# Patient Record
Sex: FEMALE | Race: BLACK OR AFRICAN AMERICAN | NOT HISPANIC OR LATINO | Employment: OTHER | URBAN - METROPOLITAN AREA
[De-identification: names, ages, dates, MRNs, and addresses within clinical notes are randomized per-mention and may not be internally consistent; named-entity substitution may affect disease eponyms.]

---

## 2017-02-10 ENCOUNTER — HISTORICAL (OUTPATIENT)
Dept: RADIOLOGY | Facility: HOSPITAL | Age: 52
End: 2017-02-10

## 2017-10-10 ENCOUNTER — HISTORICAL (OUTPATIENT)
Dept: LAB | Facility: HOSPITAL | Age: 52
End: 2017-10-10

## 2017-10-10 LAB
ABS NEUT (OLG): 2.93 X10(3)/MCL (ref 2.1–9.2)
ALBUMIN SERPL-MCNC: 3.3 GM/DL (ref 3.4–5)
ALBUMIN/GLOB SERPL: 1 {RATIO}
ALP SERPL-CCNC: 61 UNIT/L (ref 45–117)
ALT SERPL-CCNC: 18 UNIT/L (ref 13–56)
APPEARANCE, UA: CLEAR
AST SERPL-CCNC: 16 UNIT/L (ref 15–37)
BACTERIA SPEC CULT: NORMAL
BASOPHILS # BLD AUTO: 0.02 X10(3)/MCL (ref 0–0.2)
BASOPHILS NFR BLD AUTO: 0.3 % (ref 0–1)
BILIRUB SERPL-MCNC: 0.3 MG/DL (ref 0.2–1)
BILIRUB UR QL STRIP: NEGATIVE
BILIRUBIN DIRECT+TOT PNL SERPL-MCNC: 0.1 MG/DL (ref 0–0.2)
BILIRUBIN DIRECT+TOT PNL SERPL-MCNC: 0.2 MG/DL (ref 0–1)
BUN SERPL-MCNC: 13 MG/DL (ref 7–18)
CALCIUM SERPL-MCNC: 8.5 MG/DL (ref 8.5–10.1)
CHLORIDE SERPL-SCNC: 109 MMOL/L (ref 98–107)
CHOLEST SERPL-MCNC: 234 MG/DL (ref 0–200)
CHOLEST/HDLC SERPL: 5.8 {RATIO} (ref 0–4)
CO2 SERPL-SCNC: 27 MMOL/L (ref 21–32)
COLOR UR: YELLOW
CREAT SERPL-MCNC: 0.85 MG/DL (ref 0.55–1.02)
DEPRECATED CALCIDIOL+CALCIFEROL SERPL-MC: 22.2 NG/ML (ref 30–80)
EOSINOPHIL # BLD AUTO: 0.24 X10(3)/MCL (ref 0–0.9)
EOSINOPHIL NFR BLD AUTO: 4.1 % (ref 0–6.4)
ERYTHROCYTE [DISTWIDTH] IN BLOOD BY AUTOMATED COUNT: 13.2 % (ref 11.5–17)
EST. AVERAGE GLUCOSE BLD GHB EST-MCNC: 117 MG/DL
GLOBULIN SER-MCNC: 4 GM/DL (ref 2–4)
GLUCOSE (UA): NEGATIVE
GLUCOSE SERPL-MCNC: 90 MG/DL (ref 74–106)
HBA1C MFR BLD: 5.7 % (ref 4.2–6.3)
HCT VFR BLD AUTO: 38.9 % (ref 37–47)
HDLC SERPL-MCNC: 40 MG/DL (ref 35–60)
HGB BLD-MCNC: 12.5 GM/DL (ref 12–16)
HGB UR QL STRIP: ABNORMAL
IMM GRANULOCYTES # BLD AUTO: 0.01 10*3/UL (ref 0–0.02)
IMM GRANULOCYTES NFR BLD AUTO: 0.2 % (ref 0–0.43)
KETONES UR QL STRIP: NEGATIVE
LDLC SERPL CALC-MCNC: 135 MG/DL (ref 0–129)
LEUKOCYTE ESTERASE UR QL STRIP: NEGATIVE
LYMPHOCYTES # BLD AUTO: 2.31 X10(3)/MCL (ref 0.6–4.6)
LYMPHOCYTES NFR BLD AUTO: 39.4 % (ref 16–44)
MCH RBC QN AUTO: 26.3 PG (ref 27–31)
MCHC RBC AUTO-ENTMCNC: 32.1 GM/DL (ref 33–36)
MCV RBC AUTO: 81.7 FL (ref 80–94)
MONOCYTES # BLD AUTO: 0.36 X10(3)/MCL (ref 0.1–1.3)
MONOCYTES NFR BLD AUTO: 6.1 % (ref 4–12.1)
NEUTROPHILS # BLD AUTO: 2.93 X10(3)/MCL (ref 2.1–9.2)
NEUTROPHILS NFR BLD AUTO: 49.9 % (ref 43–73)
NITRITE UR QL STRIP: NEGATIVE
NRBC BLD AUTO-RTO: 0 % (ref 0–0.2)
PH UR STRIP: 6 [PH] (ref 5–7)
PLATELET # BLD AUTO: 229 X10(3)/MCL (ref 130–400)
PMV BLD AUTO: 10.6 FL (ref 7.4–10.4)
POTASSIUM SERPL-SCNC: 4.5 MMOL/L (ref 3.5–5.1)
PROT SERPL-MCNC: 7.4 GM/DL (ref 6.4–8.2)
PROT UR QL STRIP: NEGATIVE
RBC # BLD AUTO: 4.76 X10(6)/MCL (ref 4.2–5.4)
RBC #/AREA URNS HPF: NORMAL /HPF
SODIUM SERPL-SCNC: 138 MMOL/L (ref 136–145)
SP GR UR STRIP: 1.01 (ref 1–1.03)
SQUAMOUS EPITHELIAL, UA: NORMAL /LPF
T4 FREE SERPL-MCNC: 0.84 NG/DL (ref 0.76–1.46)
TRIGL SERPL-MCNC: 294 MG/DL (ref 30–150)
TSH SERPL-ACNC: 1.26 MIU/ML (ref 0.36–3.74)
UROBILINOGEN UR STRIP-ACNC: NEGATIVE
VLDLC SERPL CALC-MCNC: 59 MG/DL
WBC # SPEC AUTO: 5.9 X10(3)/MCL (ref 4.5–11.5)
WBC #/AREA URNS HPF: NORMAL /HPF

## 2018-01-03 ENCOUNTER — HISTORICAL (OUTPATIENT)
Dept: RADIOLOGY | Facility: HOSPITAL | Age: 53
End: 2018-01-03

## 2018-02-20 ENCOUNTER — HISTORICAL (OUTPATIENT)
Dept: ADMINISTRATIVE | Facility: HOSPITAL | Age: 53
End: 2018-02-20

## 2018-04-09 LAB — RAPID GROUP A STREP (OHS): NEGATIVE

## 2018-09-19 ENCOUNTER — HISTORICAL (OUTPATIENT)
Dept: LAB | Facility: HOSPITAL | Age: 53
End: 2018-09-19

## 2018-09-19 LAB
ABS NEUT (OLG): 2.19 X10(3)/MCL (ref 2.1–9.2)
ALBUMIN SERPL-MCNC: 3.3 GM/DL (ref 3.4–5)
ALBUMIN/GLOB SERPL: 1 {RATIO}
ALP SERPL-CCNC: 63 UNIT/L (ref 45–117)
ALT SERPL-CCNC: 18 UNIT/L (ref 13–56)
APPEARANCE, UA: ABNORMAL
AST SERPL-CCNC: 13 UNIT/L (ref 15–37)
BACTERIA SPEC CULT: ABNORMAL /HPF
BASOPHILS # BLD AUTO: 0.03 X10(3)/MCL (ref 0–0.2)
BASOPHILS NFR BLD AUTO: 0.6 % (ref 0–1)
BILIRUB SERPL-MCNC: 0.3 MG/DL (ref 0.2–1)
BILIRUB UR QL STRIP: NEGATIVE
BILIRUBIN DIRECT+TOT PNL SERPL-MCNC: <0.1 MG/DL (ref 0–0.2)
BILIRUBIN DIRECT+TOT PNL SERPL-MCNC: >0.2 MG/DL (ref 0–1)
BUN SERPL-MCNC: 12 MG/DL (ref 7–18)
CALCIUM SERPL-MCNC: 8.4 MG/DL (ref 8.5–10.1)
CHLORIDE SERPL-SCNC: 108 MMOL/L (ref 98–107)
CHOLEST SERPL-MCNC: 195 MG/DL (ref 0–199)
CHOLEST/HDLC SERPL: 5 MG/DL (ref 0–8)
CO2 SERPL-SCNC: 26 MMOL/L (ref 21–32)
COLOR UR: YELLOW
CREAT SERPL-MCNC: 0.75 MG/DL (ref 0.55–1.02)
DEPRECATED CALCIDIOL+CALCIFEROL SERPL-MC: 19.16 NG/ML (ref 30–80)
EOSINOPHIL # BLD AUTO: 0.28 X10(3)/MCL (ref 0–0.9)
EOSINOPHIL NFR BLD AUTO: 5.5 % (ref 0–6.4)
ERYTHROCYTE [DISTWIDTH] IN BLOOD BY AUTOMATED COUNT: 13.2 % (ref 11.5–17)
EST. AVERAGE GLUCOSE BLD GHB EST-MCNC: 123 MG/DL
GLOBULIN SER-MCNC: 4 GM/DL (ref 2–4)
GLUCOSE (UA): NEGATIVE
GLUCOSE SERPL-MCNC: 95 MG/DL (ref 74–106)
HBA1C MFR BLD: 5.9 % (ref 4.2–6.3)
HCT VFR BLD AUTO: 39.2 % (ref 37–47)
HDLC SERPL-MCNC: 43 MG/DL
HGB BLD-MCNC: 12.6 GM/DL (ref 12–16)
HGB UR QL STRIP: ABNORMAL
IMM GRANULOCYTES # BLD AUTO: 0 10*3/UL (ref 0–0.02)
IMM GRANULOCYTES NFR BLD AUTO: 0 % (ref 0–0.43)
KETONES UR QL STRIP: NEGATIVE
LDLC SERPL CALC-MCNC: 106 MG/DL (ref 0–129)
LEUKOCYTE ESTERASE UR QL STRIP: NEGATIVE
LYMPHOCYTES # BLD AUTO: 2.24 X10(3)/MCL (ref 0.6–4.6)
LYMPHOCYTES NFR BLD AUTO: 44.2 % (ref 16–44)
MCH RBC QN AUTO: 26.5 PG (ref 27–31)
MCHC RBC AUTO-ENTMCNC: 32.1 GM/DL (ref 33–36)
MCV RBC AUTO: 82.5 FL (ref 80–94)
MONOCYTES # BLD AUTO: 0.33 X10(3)/MCL (ref 0.1–1.3)
MONOCYTES NFR BLD AUTO: 6.5 % (ref 4–12.1)
MUCOUS THREADS URNS QL MICRO: ABNORMAL /LPF
NEUTROPHILS # BLD AUTO: 2.19 X10(3)/MCL (ref 2.1–9.2)
NEUTROPHILS NFR BLD AUTO: 43.2 % (ref 43–73)
NITRITE UR QL STRIP: NEGATIVE
NRBC BLD AUTO-RTO: 0 % (ref 0–0.2)
PH UR STRIP: 5.5 [PH] (ref 5–7)
PLATELET # BLD AUTO: 233 X10(3)/MCL (ref 130–400)
PMV BLD AUTO: 10.4 FL (ref 7.4–10.4)
POTASSIUM SERPL-SCNC: 4.1 MMOL/L (ref 3.5–5.1)
PROT SERPL-MCNC: 7.4 GM/DL (ref 6.4–8.2)
PROT UR QL STRIP: NEGATIVE
RBC # BLD AUTO: 4.75 X10(6)/MCL (ref 4.2–5.4)
RBC #/AREA URNS HPF: ABNORMAL /HPF
SODIUM SERPL-SCNC: 140 MMOL/L (ref 136–145)
SP GR UR STRIP: >=1.03 (ref 1–1.03)
SQUAMOUS EPITHELIAL, UA: ABNORMAL /LPF
T4 FREE SERPL-MCNC: 0.94 NG/DL (ref 0.76–1.46)
TRIGL SERPL-MCNC: 230 MG/DL (ref 0–149)
TSH SERPL-ACNC: 0.73 MIU/ML (ref 0.36–3.74)
UROBILINOGEN UR STRIP-ACNC: NEGATIVE
VLDLC SERPL CALC-MCNC: 46 MG/DL
WBC # SPEC AUTO: 5.1 X10(3)/MCL (ref 4.5–11.5)
WBC #/AREA URNS HPF: ABNORMAL /HPF

## 2018-09-28 ENCOUNTER — HISTORICAL (OUTPATIENT)
Dept: LAB | Facility: HOSPITAL | Age: 53
End: 2018-09-28

## 2018-09-28 LAB
APPEARANCE, UA: ABNORMAL
BACTERIA SPEC CULT: ABNORMAL /HPF
BILIRUB UR QL STRIP: NEGATIVE
COLOR UR: YELLOW
GLUCOSE (UA): NEGATIVE
HGB UR QL STRIP: ABNORMAL
KETONES UR QL STRIP: NEGATIVE
LEUKOCYTE ESTERASE UR QL STRIP: NEGATIVE
MUCOUS THREADS URNS QL MICRO: ABNORMAL /LPF
NITRITE UR QL STRIP: NEGATIVE
PH UR STRIP: 6 [PH] (ref 5–7)
PROT UR QL STRIP: NEGATIVE
RBC #/AREA URNS HPF: ABNORMAL /HPF
SP GR UR STRIP: >=1.03 (ref 1–1.03)
SQUAMOUS EPITHELIAL, UA: ABNORMAL /LPF
UROBILINOGEN UR STRIP-ACNC: NEGATIVE
WBC #/AREA URNS HPF: ABNORMAL /HPF

## 2018-11-12 ENCOUNTER — HISTORICAL (OUTPATIENT)
Dept: LAB | Facility: HOSPITAL | Age: 53
End: 2018-11-12

## 2018-11-14 LAB — FINAL CULTURE: NO GROWTH

## 2020-03-25 LAB
INFLUENZA A ANTIGEN, POC: NEGATIVE
INFLUENZA B ANTIGEN, POC: NEGATIVE

## 2022-04-10 ENCOUNTER — HISTORICAL (OUTPATIENT)
Dept: ADMINISTRATIVE | Facility: HOSPITAL | Age: 57
End: 2022-04-10

## 2022-04-24 VITALS
DIASTOLIC BLOOD PRESSURE: 79 MMHG | WEIGHT: 222.25 LBS | HEIGHT: 63 IN | SYSTOLIC BLOOD PRESSURE: 147 MMHG | BODY MASS INDEX: 39.38 KG/M2 | OXYGEN SATURATION: 97 %

## 2022-05-03 NOTE — HISTORICAL [facility]ER
This is a historical note converted from Enzo. Formatting and pictures may have been removed.  Please reference Enzo for original formatting and attached multimedia. Chief Complaint  PT. STATED THAT SHE STARTED WITH LEFT LEG KNEE PAIN FOR 1 MONTH. PT. REPORTS OF MINIMUM PAIN TODAY 2/10. PAIN IS WORST AFTER WORKING ALL DAY...SB  History of Present Illness  This is a 52-year-old female that comes in with complaints of?left knee and?entire left leg pain?and numbness. ?She states the leg initially started with pain away from the?buttock area to the foot. ?She also has no weakness in that leg as well.? She states recently she did have more pain in the knee itself?specifically more in the back portion of the knee. ?She has tried NSAIDs and some home exercises with?no improvement.? Pain today in the knee itself is about a 2 out of 10.? The pain is worsened by increased activity at work.  Review of Systems  ????GENERAL: No fevers, chills, unexpected weight loss or gain  ????MUSCULOSKELETAL: Joint pain, extremity pain  Physical Exam  Vitals & Measurements  BP:?156/98?  HT:?160?cm? HT:?160?cm? WT:?107?kg? WT:?107?kg? BMI:?41.8?  General: Well-developed, well-nourished.  Neuro: Alert and oriented x 3.  Psych: Normal mood and affect.  CV: Palpable radial pulses.  Resp: Smooth and unlabored.  Skin: No evidence of focal lesions or trauma.  Hem/Imm/Lymph: No evidence of lymphangitis or adenopathy.  Gait: No trendelenburg gait.  DTR: 2+, no hypo or hyperreflexia.  Coordination and Balance: No tremors or abnormal station.  Knee Exam:  No obvious deformity. Range of motion from 0-120 degrees. Negative patella grind and equal subluxation of knee cap medial and lateral < 1cm. Negative patella tendon tenderness. Negative Lachman and anterior drawer test. Negative posterior drawer test. Negative varus and valgus stress test. ?Positive?medial joint line tenderness.? Positive McMurrays with medial sided knee pain.? Negative lateral  joint line tenderness. 4/5 strength and normal skin appearance. Sensibility normal.  Assessment/Plan  1.?Cyst, Bakers knee  ? This patients x-ray show no acute osseous pathology, minimal degenerative changes, and some?lateral facet?osteoarthritis of the patella.? We discussed multiple options.? We have decided to proceed with a left knee MRI to rule out a meniscus tear and Bakers cyst. ?This will be roadmap for surgery. ?I will get x-rays of her lumbar spine on her next visit.  Ordered:  Office/Outpatient Visit Level 3 New [id number] PC  ?  2.?Tear of medial meniscus of left knee  Ordered:  Office/Outpatient Visit Level 3 New [id number] PC  ?  3.?Sciatica of left side  ? See above  Ordered:  Office/Outpatient Visit Level 3 New [id number] PC  ?   Problem List/Past Medical History  Ongoing  Morbid obesity  Osteoarthritis  Historical  No qualifying data  Procedure/Surgical History  Catheter placement in coronary artery(s) for coronary angiography, including intraprocedural injection(s) for coronary angiography, imaging supervision and interpretation; with left heart catheterization including intraprocedural injection(s) for left saul (2016), Fluoroscopy of Left Heart using Low Osmolar Contrast (2016), Fluoroscopy of Multiple Coronary Arteries using Low Osmolar Contrast (2016), Measurement of Cardiac Sampling and Pressure, Left Heart, Percutaneous Approach (2016),  section (),  section (), Hysterectomy, Tubal ligation.  Medications  MELOXICAM TAB 15MG, 15 mg= 1 tab(s), Oral, Daily  Allergies  penicillins  Social History  Alcohol - Denies Alcohol Use, 2015  Never, 2018  Substance Abuse - Denies Substance Abuse, 2015  Never, 2018  Tobacco - Denies Tobacco Use, 2015  Never smoker, 2017  Family History  Cancer: Father.  DIABETES: Mother.  Multiple sclerosis: Brother.  Rheumatoid arthritis 2017 17:45:38<$>: Mother.

## 2022-05-20 ENCOUNTER — OFFICE VISIT (OUTPATIENT)
Dept: URGENT CARE | Facility: CLINIC | Age: 57
End: 2022-05-20
Payer: COMMERCIAL

## 2022-05-20 VITALS
OXYGEN SATURATION: 98 % | SYSTOLIC BLOOD PRESSURE: 110 MMHG | DIASTOLIC BLOOD PRESSURE: 71 MMHG | WEIGHT: 224 LBS | TEMPERATURE: 98 F | BODY MASS INDEX: 41.22 KG/M2 | RESPIRATION RATE: 20 BRPM | HEART RATE: 65 BPM | HEIGHT: 62 IN

## 2022-05-20 DIAGNOSIS — M62.838 MUSCLE SPASM: Primary | ICD-10-CM

## 2022-05-20 PROBLEM — J30.2 SEASONAL ALLERGIES: Status: ACTIVE | Noted: 2022-05-20

## 2022-05-20 PROBLEM — M19.90 OSTEOARTHRITIS: Status: ACTIVE | Noted: 2022-05-20

## 2022-05-20 PROBLEM — E78.2 MIXED HYPERLIPIDEMIA: Status: ACTIVE | Noted: 2022-05-20

## 2022-05-20 PROBLEM — G43.109 MIGRAINE WITH AURA: Status: ACTIVE | Noted: 2022-05-20

## 2022-05-20 PROBLEM — R73.03 PREDIABETES: Status: ACTIVE | Noted: 2022-05-20

## 2022-05-20 PROBLEM — I10 ESSENTIAL HYPERTENSION: Status: ACTIVE | Noted: 2022-05-20

## 2022-05-20 PROCEDURE — 1159F PR MEDICATION LIST DOCUMENTED IN MEDICAL RECORD: ICD-10-PCS | Mod: CPTII,,, | Performed by: PHYSICIAN ASSISTANT

## 2022-05-20 PROCEDURE — 3078F DIAST BP <80 MM HG: CPT | Mod: CPTII,,, | Performed by: PHYSICIAN ASSISTANT

## 2022-05-20 PROCEDURE — 99213 OFFICE O/P EST LOW 20 MIN: CPT | Mod: 25,,, | Performed by: PHYSICIAN ASSISTANT

## 2022-05-20 PROCEDURE — 99213 PR OFFICE/OUTPT VISIT, EST, LEVL III, 20-29 MIN: ICD-10-PCS | Mod: 25,,, | Performed by: PHYSICIAN ASSISTANT

## 2022-05-20 PROCEDURE — 96372 PR INJECTION,THERAP/PROPH/DIAG2ST, IM OR SUBCUT: ICD-10-PCS | Mod: ,,, | Performed by: PHYSICIAN ASSISTANT

## 2022-05-20 PROCEDURE — 3008F PR BODY MASS INDEX (BMI) DOCUMENTED: ICD-10-PCS | Mod: CPTII,,, | Performed by: PHYSICIAN ASSISTANT

## 2022-05-20 PROCEDURE — 3078F PR MOST RECENT DIASTOLIC BLOOD PRESSURE < 80 MM HG: ICD-10-PCS | Mod: CPTII,,, | Performed by: PHYSICIAN ASSISTANT

## 2022-05-20 PROCEDURE — 3074F SYST BP LT 130 MM HG: CPT | Mod: CPTII,,, | Performed by: PHYSICIAN ASSISTANT

## 2022-05-20 PROCEDURE — 3008F BODY MASS INDEX DOCD: CPT | Mod: CPTII,,, | Performed by: PHYSICIAN ASSISTANT

## 2022-05-20 PROCEDURE — 1160F RVW MEDS BY RX/DR IN RCRD: CPT | Mod: CPTII,,, | Performed by: PHYSICIAN ASSISTANT

## 2022-05-20 PROCEDURE — 1159F MED LIST DOCD IN RCRD: CPT | Mod: CPTII,,, | Performed by: PHYSICIAN ASSISTANT

## 2022-05-20 PROCEDURE — 96372 THER/PROPH/DIAG INJ SC/IM: CPT | Mod: ,,, | Performed by: PHYSICIAN ASSISTANT

## 2022-05-20 PROCEDURE — 3074F PR MOST RECENT SYSTOLIC BLOOD PRESSURE < 130 MM HG: ICD-10-PCS | Mod: CPTII,,, | Performed by: PHYSICIAN ASSISTANT

## 2022-05-20 PROCEDURE — 1160F PR REVIEW ALL MEDS BY PRESCRIBER/CLIN PHARMACIST DOCUMENTED: ICD-10-PCS | Mod: CPTII,,, | Performed by: PHYSICIAN ASSISTANT

## 2022-05-20 RX ORDER — AMLODIPINE BESYLATE 5 MG/1
1 TABLET ORAL DAILY
COMMUNITY
End: 2023-11-29 | Stop reason: SDUPTHER

## 2022-05-20 RX ORDER — KETOROLAC TROMETHAMINE 30 MG/ML
30 INJECTION, SOLUTION INTRAMUSCULAR; INTRAVENOUS
Status: COMPLETED | OUTPATIENT
Start: 2022-05-20 | End: 2022-05-20

## 2022-05-20 RX ORDER — MONTELUKAST SODIUM 10 MG/1
TABLET ORAL
COMMUNITY
Start: 2022-05-13 | End: 2023-07-05 | Stop reason: SDUPTHER

## 2022-05-20 RX ORDER — FLUTICASONE PROPIONATE 50 MCG
SPRAY, SUSPENSION (ML) NASAL
COMMUNITY
End: 2023-11-27 | Stop reason: SDUPTHER

## 2022-05-20 RX ORDER — RIZATRIPTAN BENZOATE 10 MG/1
TABLET, ORALLY DISINTEGRATING ORAL
COMMUNITY
End: 2023-07-11

## 2022-05-20 RX ORDER — MELOXICAM 15 MG/1
TABLET ORAL
COMMUNITY
Start: 2021-10-07 | End: 2023-05-23

## 2022-05-20 RX ORDER — DEXAMETHASONE SODIUM PHOSPHATE 100 MG/10ML
10 INJECTION INTRAMUSCULAR; INTRAVENOUS
Status: COMPLETED | OUTPATIENT
Start: 2022-05-20 | End: 2022-05-20

## 2022-05-20 RX ORDER — FLUTICASONE FUROATE AND VILANTEROL TRIFENATATE 100; 25 UG/1; UG/1
POWDER RESPIRATORY (INHALATION)
COMMUNITY
Start: 2022-05-13 | End: 2023-05-23

## 2022-05-20 RX ORDER — CYCLOBENZAPRINE HCL 10 MG
10 TABLET ORAL 3 TIMES DAILY PRN
Qty: 15 TABLET | Refills: 0 | Status: SHIPPED | OUTPATIENT
Start: 2022-05-20 | End: 2022-05-25

## 2022-05-20 RX ORDER — ALBUTEROL SULFATE 90 UG/1
AEROSOL, METERED RESPIRATORY (INHALATION)
COMMUNITY
Start: 2022-05-13 | End: 2023-11-27 | Stop reason: SDUPTHER

## 2022-05-20 RX ADMIN — DEXAMETHASONE SODIUM PHOSPHATE 10 MG: 100 INJECTION INTRAMUSCULAR; INTRAVENOUS at 10:05

## 2022-05-20 RX ADMIN — KETOROLAC TROMETHAMINE 30 MG: 30 INJECTION, SOLUTION INTRAMUSCULAR; INTRAVENOUS at 10:05

## 2022-05-20 NOTE — PROGRESS NOTES
Subjective:       Patient ID: Rae Gudino is a 57 y.o. female.    Vitals:  vitals were not taken for this visit.     Chief Complaint: Muscle Pain    History of Presenting Illness     Patient ID: Rae Gudino     Chief Complaint: Muscle Pain    HPI:  Patient is a 57 y.o. year old female who presents to urgent care with complaints of left-sided trapezius muscle spasm for the past two days.  Patient states she believes she slept on her shoulder wrong and ever since has been having spasm to the left trapezius muscle.  Patient states she has had this in the past and symptoms seem similar in nature.  Patient states she will occasionally have numbness and tingling in the hands at nighttime although this has not changed from normal.  She denies any radiating symptoms down the arm, weakness of the upper extremities, decreased  strength, or decreased range of motion of the shoulders.  Patient also denies any chest pain, shortness a breath, wheezing, palpitations, racing heart rate, radiation of the pain to the jaw, or lower extremity swelling.  Patient's pain is worse with movement of the shoulder and when she is touching the trapezius muscle. Patient has not taken anything for her pain today. She is able to take ibuprofen. She used to be on Meloxicam although is no longer taking this. Rest makes her pain better.  Otherwise no fever, chills, myalgias, nausea, vomiting, diarrhea, abdominal pain, or rashes.    Review of Systems:  General: Denies fever, chills, fatigue, myalgias, and change in appetite   Cardio: See above   Resp: See above   MSK: See above   Neuro: Denies LOC, dizziness, seizure like activity, and focal deficits   Skin: See above     Previous History     Review of patient's allergies indicates:   Allergen Reactions    Penicillins Rash     Past Medical History:   Diagnosis Date    Hypertension     Migraine      Current Outpatient Medications   Medication Instructions    albuterol  "(PROVENTIL/VENTOLIN HFA) 90 mcg/actuation inhaler 2 puff    amLODIPine (NORVASC) 5 MG tablet 1 tablet, Oral, Daily    cyclobenzaprine (FLEXERIL) 10 mg, Oral, 3 times daily PRN    fluticasone furoate-vilanteroL (BREO ELLIPTA) 100-25 mcg/dose diskus inhaler 1 puff    fluticasone propionate (FLONASE) 50 mcg/actuation nasal spray 1 spray in each nostril    meloxicam (MOBIC) 15 MG tablet 1 tablet    montelukast (SINGULAIR) 10 mg tablet 1 tablet    rizatriptan (MAXALT-MLT) 10 MG disintegrating tablet 1 tablet on the tongue and allow to dissolve as needed one time     Past Surgical History:   Procedure Laterality Date     SECTION      PARTIAL HYSTERECTOMY      REPAIR OF MENISCUS OF KNEE Left     TUBAL LIGATION       Family History   Problem Relation Age of Onset    Diabetes type II Mother     Prostate cancer Father     Multiple sclerosis Sister        Physical Exam      Vital Signs Reviewed   /71   Pulse 65   Temp 98.4 °F (36.9 °C) (Oral)   Resp 20   Ht 5' 2" (1.575 m)   Wt 101.6 kg (224 lb)   SpO2 98%   BMI 40.97 kg/m²     Physical Exam:  General: No acute distress. Awake and conversant.    Eyes: Normal conjunctiva, anicteric. Round symmetric pupils.    ENT: Hearing grossly intact. No nasal discharge.    Neck: Neck is supple. No masses or thyromegaly.    Respiratory: Respirations are non-labored. No wheezing.  Lungs clear to auscultation.   Cardio:  Regular rate and rhythm without clicks, gallops, or rubs.  No lower extremity swelling.  Psych: Alert and oriented. Cooperative, Appropriate mood and affect,    Normal judgment.    MSK:  No obvious deformity.  Full range of motion of the shoulders bilaterally.  Pain is reproduced with shoulder range of motion.  Tender to palpation of the left trapezius muscle.  No tenderness to palpation of the cervical, thoracic, or lumbar spinous processes.  Full and equal strength of the upper extremities bilaterally.  Full and equal  strength " bilaterally.  Sensation intact.  Cap refill full.  Radial pulses full.  Compartments soft.  Neuro: Sensation and CN II-XII grossly normal.     Imaging    No results found.    Assessment        1. Muscle spasm        Plan      1. Muscle spasm    As discussed in clinic start ibuprofen 24 hours after injection.  You can take Tylenol tonight.  Muscle relaxer will cause drowsiness any should not drive, drink, or operate heavy machinery while taking this.  Monitor symptoms closely.  Seek further medical attention immediately at the 1st sign of radiating pain, weakness, worsening pain, fever, chills, or any new/worsening/persistent symptoms.  Rest the area.  Apply ice or heat for symptom relief.  Follow-up with your PCP within 72 hours.  Orders Placed This Encounter    dexamethasone injection 10 mg    ketorolac injection 30 mg    cyclobenzaprine (FLEXERIL) 10 MG tablet      Medication List with Changes/Refills   New Medications    CYCLOBENZAPRINE (FLEXERIL) 10 MG TABLET    Take 1 tablet (10 mg total) by mouth 3 (three) times daily as needed.   Current Medications    ALBUTEROL (PROVENTIL/VENTOLIN HFA) 90 MCG/ACTUATION INHALER    2 puff    AMLODIPINE (NORVASC) 5 MG TABLET    Take 1 tablet by mouth once daily.    FLUTICASONE FUROATE-VILANTEROL (BREO ELLIPTA) 100-25 MCG/DOSE DISKUS INHALER    1 puff    FLUTICASONE PROPIONATE (FLONASE) 50 MCG/ACTUATION NASAL SPRAY    1 spray in each nostril    MELOXICAM (MOBIC) 15 MG TABLET    1 tablet    MONTELUKAST (SINGULAIR) 10 MG TABLET    1 tablet    RIZATRIPTAN (MAXALT-MLT) 10 MG DISINTEGRATING TABLET    1 tablet on the tongue and allow to dissolve as needed one time       Ayleen Jaimes PA-C    No future appointments.

## 2022-05-20 NOTE — PATIENT INSTRUCTIONS
As discussed in clinic start ibuprofen 24 hours after injection.  You can take Tylenol tonight.  Muscle relaxer will cause drowsiness any should not drive, drink, or operate heavy machinery while taking this.  Monitor symptoms closely.  Seek further medical attention immediately at the 1st sign of radiating pain, weakness, worsening pain, fever, chills, or any new/worsening/persistent symptoms.  Rest the area.  Apply ice or heat for symptom relief.  Follow-up with your PCP within 72 hours.

## 2022-09-21 ENCOUNTER — HISTORICAL (OUTPATIENT)
Dept: ADMINISTRATIVE | Facility: HOSPITAL | Age: 57
End: 2022-09-21
Payer: COMMERCIAL

## 2022-12-15 LAB — BCS RECOMMENDATION EXT: NORMAL

## 2023-01-04 DIAGNOSIS — E78.2 MIXED HYPERLIPIDEMIA: Primary | ICD-10-CM

## 2023-01-10 ENCOUNTER — HOSPITAL ENCOUNTER (OUTPATIENT)
Dept: RADIOLOGY | Facility: HOSPITAL | Age: 58
Discharge: HOME | End: 2023-01-10
Attending: FAMILY MEDICINE
Payer: COMMERCIAL

## 2023-01-10 DIAGNOSIS — E78.2 MIXED HYPERLIPIDEMIA: ICD-10-CM

## 2023-01-10 PROCEDURE — 75571 CT HRT W/O DYE W/CA TEST: CPT | Mod: TC

## 2023-02-07 ENCOUNTER — LAB VISIT (OUTPATIENT)
Dept: LAB | Facility: HOSPITAL | Age: 58
End: 2023-02-07
Attending: FAMILY MEDICINE
Payer: COMMERCIAL

## 2023-02-07 DIAGNOSIS — I10 HYPERTENSION, UNSPECIFIED TYPE: Primary | ICD-10-CM

## 2023-02-07 DIAGNOSIS — R73.03 PREDIABETES: ICD-10-CM

## 2023-02-07 DIAGNOSIS — E78.2 MIXED HYPERLIPIDEMIA: ICD-10-CM

## 2023-02-07 LAB
ALBUMIN SERPL-MCNC: 3.6 G/DL (ref 3.5–5)
ALBUMIN/GLOB SERPL: 0.9 RATIO (ref 1.1–2)
ALP SERPL-CCNC: 69 UNIT/L (ref 40–150)
ALT SERPL-CCNC: 9 UNIT/L (ref 0–55)
AST SERPL-CCNC: 15 UNIT/L (ref 5–34)
BILIRUBIN DIRECT+TOT PNL SERPL-MCNC: 0.5 MG/DL
BUN SERPL-MCNC: 12.7 MG/DL (ref 9.8–20.1)
CALCIUM SERPL-MCNC: 9.1 MG/DL (ref 8.4–10.2)
CHLORIDE SERPL-SCNC: 109 MMOL/L (ref 98–107)
CHOLEST SERPL-MCNC: 170 MG/DL
CHOLEST/HDLC SERPL: 5 {RATIO} (ref 0–5)
CO2 SERPL-SCNC: 24 MMOL/L (ref 22–29)
CREAT SERPL-MCNC: 0.79 MG/DL (ref 0.55–1.02)
EST. AVERAGE GLUCOSE BLD GHB EST-MCNC: 108.3 MG/DL
GFR SERPLBLD CREATININE-BSD FMLA CKD-EPI: >60 MLS/MIN/1.73/M2
GLOBULIN SER-MCNC: 3.8 GM/DL (ref 2.4–3.5)
GLUCOSE SERPL-MCNC: 95 MG/DL (ref 74–100)
HBA1C MFR BLD: 5.4 %
HDLC SERPL-MCNC: 34 MG/DL (ref 35–60)
LDLC SERPL CALC-MCNC: 110 MG/DL (ref 50–140)
POTASSIUM SERPL-SCNC: 3.7 MMOL/L (ref 3.5–5.1)
PROT SERPL-MCNC: 7.4 GM/DL (ref 6.4–8.3)
SODIUM SERPL-SCNC: 139 MMOL/L (ref 136–145)
TRIGL SERPL-MCNC: 129 MG/DL (ref 37–140)
VLDLC SERPL CALC-MCNC: 26 MG/DL

## 2023-02-07 PROCEDURE — 36415 COLL VENOUS BLD VENIPUNCTURE: CPT

## 2023-02-07 PROCEDURE — 80061 LIPID PANEL: CPT

## 2023-02-07 PROCEDURE — 80053 COMPREHEN METABOLIC PANEL: CPT

## 2023-02-07 PROCEDURE — 83036 HEMOGLOBIN GLYCOSYLATED A1C: CPT

## 2023-04-12 ENCOUNTER — DOCUMENTATION ONLY (OUTPATIENT)
Dept: FAMILY MEDICINE | Facility: CLINIC | Age: 58
End: 2023-04-12
Payer: COMMERCIAL

## 2023-05-09 PROBLEM — E04.9 ENLARGED THYROID GLAND: Status: ACTIVE | Noted: 2023-05-09

## 2023-05-09 PROBLEM — G43.109 MIGRAINE WITH AURA AND WITHOUT STATUS MIGRAINOSUS, NOT INTRACTABLE: Status: ACTIVE | Noted: 2023-05-09

## 2023-05-17 ENCOUNTER — LAB VISIT (OUTPATIENT)
Dept: LAB | Facility: HOSPITAL | Age: 58
End: 2023-05-17
Attending: FAMILY MEDICINE
Payer: COMMERCIAL

## 2023-05-17 DIAGNOSIS — Z00.00 ROUTINE GENERAL MEDICAL EXAMINATION AT A HEALTH CARE FACILITY: Primary | ICD-10-CM

## 2023-05-17 LAB
ALBUMIN SERPL-MCNC: 3.5 G/DL (ref 3.5–5)
ALBUMIN/GLOB SERPL: 0.9 RATIO (ref 1.1–2)
ALP SERPL-CCNC: 67 UNIT/L (ref 40–150)
ALT SERPL-CCNC: 16 UNIT/L (ref 0–55)
APPEARANCE UR: CLEAR
AST SERPL-CCNC: 15 UNIT/L (ref 5–34)
BACTERIA #/AREA URNS AUTO: NORMAL /HPF
BASOPHILS # BLD AUTO: 0.04 X10(3)/MCL
BASOPHILS NFR BLD AUTO: 0.8 %
BILIRUB UR QL STRIP.AUTO: NEGATIVE MG/DL
BILIRUBIN DIRECT+TOT PNL SERPL-MCNC: 0.3 MG/DL
BUN SERPL-MCNC: 9.7 MG/DL (ref 9.8–20.1)
CALCIUM SERPL-MCNC: 9.4 MG/DL (ref 8.4–10.2)
CHLORIDE SERPL-SCNC: 109 MMOL/L (ref 98–107)
CHOLEST SERPL-MCNC: 192 MG/DL
CHOLEST/HDLC SERPL: 4 {RATIO} (ref 0–5)
CO2 SERPL-SCNC: 24 MMOL/L (ref 22–29)
COLOR UR: ABNORMAL
CREAT SERPL-MCNC: 0.68 MG/DL (ref 0.55–1.02)
DEPRECATED CALCIDIOL+CALCIFEROL SERPL-MC: 27 NG/ML (ref 30–80)
EOSINOPHIL # BLD AUTO: 0.31 X10(3)/MCL (ref 0–0.9)
EOSINOPHIL NFR BLD AUTO: 6.4 %
ERYTHROCYTE [DISTWIDTH] IN BLOOD BY AUTOMATED COUNT: 13.2 % (ref 11.5–17)
EST. AVERAGE GLUCOSE BLD GHB EST-MCNC: 108.3 MG/DL
GFR SERPLBLD CREATININE-BSD FMLA CKD-EPI: >60 MLS/MIN/1.73/M2
GLOBULIN SER-MCNC: 3.8 GM/DL (ref 2.4–3.5)
GLUCOSE SERPL-MCNC: 87 MG/DL (ref 74–100)
GLUCOSE UR QL STRIP.AUTO: NEGATIVE MG/DL
HBA1C MFR BLD: 5.4 %
HCT VFR BLD AUTO: 39.4 % (ref 37–47)
HDLC SERPL-MCNC: 43 MG/DL (ref 35–60)
HGB BLD-MCNC: 12.9 G/DL (ref 12–16)
IMM GRANULOCYTES # BLD AUTO: 0.01 X10(3)/MCL (ref 0–0.04)
IMM GRANULOCYTES NFR BLD AUTO: 0.2 %
KETONES UR QL STRIP.AUTO: NEGATIVE MG/DL
LDLC SERPL CALC-MCNC: 104 MG/DL (ref 50–140)
LEUKOCYTE ESTERASE UR QL STRIP.AUTO: NEGATIVE UNIT/L
LYMPHOCYTES # BLD AUTO: 2.1 X10(3)/MCL (ref 0.6–4.6)
LYMPHOCYTES NFR BLD AUTO: 43.4 %
MCH RBC QN AUTO: 27.2 PG (ref 27–31)
MCHC RBC AUTO-ENTMCNC: 32.7 G/DL (ref 33–36)
MCV RBC AUTO: 82.9 FL (ref 80–94)
MONOCYTES # BLD AUTO: 0.32 X10(3)/MCL (ref 0.1–1.3)
MONOCYTES NFR BLD AUTO: 6.6 %
NEUTROPHILS # BLD AUTO: 2.06 X10(3)/MCL (ref 2.1–9.2)
NEUTROPHILS NFR BLD AUTO: 42.6 %
NITRITE UR QL STRIP.AUTO: NEGATIVE
NRBC BLD AUTO-RTO: 0 %
PH UR STRIP.AUTO: 6 [PH]
PLATELET # BLD AUTO: 229 X10(3)/MCL (ref 130–400)
PMV BLD AUTO: 10.8 FL (ref 7.4–10.4)
POTASSIUM SERPL-SCNC: 4.2 MMOL/L (ref 3.5–5.1)
PROT SERPL-MCNC: 7.3 GM/DL (ref 6.4–8.3)
PROT UR QL STRIP.AUTO: NEGATIVE MG/DL
RBC # BLD AUTO: 4.75 X10(6)/MCL (ref 4.2–5.4)
RBC #/AREA URNS AUTO: NORMAL /HPF
RBC UR QL AUTO: ABNORMAL UNIT/L
SODIUM SERPL-SCNC: 139 MMOL/L (ref 136–145)
SP GR UR STRIP.AUTO: 1.02
SQUAMOUS #/AREA URNS AUTO: NORMAL /HPF
T4 SERPL-MCNC: 6.23 UG/DL (ref 4.87–11.72)
TRIGL SERPL-MCNC: 224 MG/DL (ref 37–140)
TSH SERPL-ACNC: 0.58 UIU/ML (ref 0.35–4.94)
UROBILINOGEN UR STRIP-ACNC: 0.2 MG/DL
VLDLC SERPL CALC-MCNC: 45 MG/DL
WBC # SPEC AUTO: 4.84 X10(3)/MCL (ref 4.5–11.5)
WBC #/AREA URNS AUTO: NORMAL /HPF

## 2023-05-17 PROCEDURE — 85025 COMPLETE CBC W/AUTO DIFF WBC: CPT

## 2023-05-17 PROCEDURE — 36415 COLL VENOUS BLD VENIPUNCTURE: CPT

## 2023-05-17 PROCEDURE — 84436 ASSAY OF TOTAL THYROXINE: CPT

## 2023-05-17 PROCEDURE — 83036 HEMOGLOBIN GLYCOSYLATED A1C: CPT

## 2023-05-17 PROCEDURE — 80053 COMPREHEN METABOLIC PANEL: CPT

## 2023-05-17 PROCEDURE — 81001 URINALYSIS AUTO W/SCOPE: CPT

## 2023-05-17 PROCEDURE — 82306 VITAMIN D 25 HYDROXY: CPT

## 2023-05-17 PROCEDURE — 84443 ASSAY THYROID STIM HORMONE: CPT

## 2023-05-17 PROCEDURE — 80061 LIPID PANEL: CPT

## 2023-05-23 ENCOUNTER — OFFICE VISIT (OUTPATIENT)
Dept: FAMILY MEDICINE | Facility: CLINIC | Age: 58
End: 2023-05-23
Payer: COMMERCIAL

## 2023-05-23 VITALS
RESPIRATION RATE: 20 BRPM | WEIGHT: 212.63 LBS | HEART RATE: 69 BPM | HEIGHT: 63 IN | TEMPERATURE: 99 F | OXYGEN SATURATION: 98 % | SYSTOLIC BLOOD PRESSURE: 118 MMHG | BODY MASS INDEX: 37.68 KG/M2 | DIASTOLIC BLOOD PRESSURE: 79 MMHG

## 2023-05-23 DIAGNOSIS — R73.03 PREDIABETES: ICD-10-CM

## 2023-05-23 DIAGNOSIS — E78.2 MIXED HYPERLIPIDEMIA: ICD-10-CM

## 2023-05-23 DIAGNOSIS — R20.0 NUMBNESS AND TINGLING IN LEFT HAND: ICD-10-CM

## 2023-05-23 DIAGNOSIS — I10 ESSENTIAL HYPERTENSION: ICD-10-CM

## 2023-05-23 DIAGNOSIS — G43.109 MIGRAINE WITH AURA AND WITHOUT STATUS MIGRAINOSUS, NOT INTRACTABLE: ICD-10-CM

## 2023-05-23 DIAGNOSIS — E04.1 THYROID NODULE: ICD-10-CM

## 2023-05-23 DIAGNOSIS — Z00.00 WELLNESS EXAMINATION: Primary | ICD-10-CM

## 2023-05-23 DIAGNOSIS — J30.2 SEASONAL ALLERGIES: ICD-10-CM

## 2023-05-23 DIAGNOSIS — R20.2 NUMBNESS AND TINGLING IN LEFT HAND: ICD-10-CM

## 2023-05-23 PROCEDURE — 1160F PR REVIEW ALL MEDS BY PRESCRIBER/CLIN PHARMACIST DOCUMENTED: ICD-10-PCS | Mod: CPTII,,, | Performed by: FAMILY MEDICINE

## 2023-05-23 PROCEDURE — 1160F RVW MEDS BY RX/DR IN RCRD: CPT | Mod: CPTII,,, | Performed by: FAMILY MEDICINE

## 2023-05-23 PROCEDURE — 3078F DIAST BP <80 MM HG: CPT | Mod: CPTII,,, | Performed by: FAMILY MEDICINE

## 2023-05-23 PROCEDURE — 3078F PR MOST RECENT DIASTOLIC BLOOD PRESSURE < 80 MM HG: ICD-10-PCS | Mod: CPTII,,, | Performed by: FAMILY MEDICINE

## 2023-05-23 PROCEDURE — 3008F PR BODY MASS INDEX (BMI) DOCUMENTED: ICD-10-PCS | Mod: CPTII,,, | Performed by: FAMILY MEDICINE

## 2023-05-23 PROCEDURE — 99396 PR PREVENTIVE VISIT,EST,40-64: ICD-10-PCS | Mod: ,,, | Performed by: FAMILY MEDICINE

## 2023-05-23 PROCEDURE — 3074F PR MOST RECENT SYSTOLIC BLOOD PRESSURE < 130 MM HG: ICD-10-PCS | Mod: CPTII,,, | Performed by: FAMILY MEDICINE

## 2023-05-23 PROCEDURE — 3008F BODY MASS INDEX DOCD: CPT | Mod: CPTII,,, | Performed by: FAMILY MEDICINE

## 2023-05-23 PROCEDURE — 3074F SYST BP LT 130 MM HG: CPT | Mod: CPTII,,, | Performed by: FAMILY MEDICINE

## 2023-05-23 PROCEDURE — 1159F PR MEDICATION LIST DOCUMENTED IN MEDICAL RECORD: ICD-10-PCS | Mod: CPTII,,, | Performed by: FAMILY MEDICINE

## 2023-05-23 PROCEDURE — 99396 PREV VISIT EST AGE 40-64: CPT | Mod: ,,, | Performed by: FAMILY MEDICINE

## 2023-05-23 PROCEDURE — 1159F MED LIST DOCD IN RCRD: CPT | Mod: CPTII,,, | Performed by: FAMILY MEDICINE

## 2023-05-23 RX ORDER — METFORMIN HYDROCHLORIDE 500 MG/1
500 TABLET ORAL
COMMUNITY
Start: 2023-03-28 | End: 2024-02-01

## 2023-05-23 NOTE — PROGRESS NOTES
Patient ID: [id number]     Chief Complaint: Annual Exam        HPI:     Rae Gudino is a 58 y.o. female here today for an annual wellness. Reviewed and discussed lab results.   1) Patient is continuing to have problems with left hand pain,  numbness and weakness-after last visit patient did not hear from surgeon's office would like to be referred to surgeon.   2) Patient completed thyroid ultrasound-did not hear regarding results of ultrasound-ultrasound was completed in March at [facility].  3) Hypertension: Patient has been taking medicine daily. BP is normal at home. No symptoms associated with increased BP such as headache/ visual changes/ dizziness/ chest pain.   4) Hyperlipidemia: Patient is taking medication at Night. No side effects of medication noted.  5) Prediabetic: Patient is taking metformin in order to help prevent diabetes-no side effects of medicine noticeable  6) Migraine headaches:  Frequency/intensity have been controlled with use of medication. No acute complaints.           Past Medical History:   Diagnosis Date    Enlarged thyroid gland 2023    Essential hypertension 2022    Migraine with aura and without status migrainosus, not intractable 2023    Mixed hyperlipidemia 2022    Prediabetes 2022    Seasonal allergies 2022        Past Surgical History:   Procedure Laterality Date     SECTION      PARTIAL HYSTERECTOMY      REPAIR OF MENISCUS OF KNEE Left     TUBAL LIGATION          Social History     Tobacco Use    Smoking status: Never    Smokeless tobacco: Never   Substance Use Topics    Alcohol use: Yes     Comment: 1 drink every three months    Drug use: Never        Social History     Socioeconomic History    Marital status:      Spouse name: Crispin    Number of children: 4        Current Outpatient Medications   Medication Instructions    albuterol (PROVENTIL/VENTOLIN HFA) 90 mcg/actuation inhaler 2 puff    amLODIPine (NORVASC) 5 MG  "tablet 1 tablet, Oral, Daily    atorvastatin (LIPITOR) 20 MG tablet TAKE 1 TABLET BY MOUTH EVERY DAY    fluticasone propionate (FLONASE) 50 mcg/actuation nasal spray 1 spray in each nostril    metFORMIN (GLUCOPHAGE) 500 mg, Oral    montelukast (SINGULAIR) 10 mg tablet 1 tablet    rizatriptan (MAXALT-MLT) 10 MG disintegrating tablet 1 tablet on the tongue and allow to dissolve as needed one time       Review of patient's allergies indicates:   Allergen Reactions    Penicillins Rash        Patient Care Team:  Sera Allen MD as PCP - General (Family Medicine)     Subjective:     Review of Systems    12 point review of systems conducted, negative except as stated in the history of present illness. See HPI for details.      Objective:     Visit Vitals  /79   Pulse 69   Temp 98.5 °F (36.9 °C)   Resp 20   Ht 5' 3" (1.6 m)   Wt 96.4 kg (212 lb 9.6 oz)   SpO2 98%   BMI 37.66 kg/m²       Physical Exam    General: Alert and oriented, No acute distress.  Head: Normocephalic, Atraumatic.  Eye: Pupils are equal, round and reactive to light, Extraocular movements are intact, Sclera non-icteric.  Ears/Nose/Throat: Normal, Mucosa moist,Clear.  Neck/Thyroid: Supple, Non-tender, No lymphadenopathy, Full range of motion.  Respiratory: Clear to auscultation bilaterally; No wheezes, rales or rhonchi  Cardiovascular: Regular rate and rhythm, S1/S2 normal, No murmurs, rubs or gallops.  Gastrointestinal: Soft, Non-tender, Non-distended, Normal bowel sounds, No palpable organomegaly.  Musculoskeletal: Normal range of motion-no change in physical exam of her left arm-consistent with nerve entrapment  Integumentary: Warm, Dry, Intact, No suspicious lesions or rashes.  Extremities: No clubbing, cyanosis or edema  Neurologic: No focal deficits, Cranial Nerves II-XII are grossly intact, Motor strength normal upper and lower extremities, Sensory exam intact.  Psychiatric: Normal interaction, Coherent speech, Euthymic mood, Appropriate " "affect       Assessment:       ICD-10-CM ICD-9-CM   1. Wellness examination  Z00.00 V70.0   2. Essential hypertension  I10 401.9   3. Mixed hyperlipidemia  E78.2 272.2   4. Prediabetes  R73.03 790.29   5. Migraine with aura and without status migrainosus, not intractable  G43.109 346.00   6. Seasonal allergies  J30.2 477.9   7. Numbness and tingling in left hand  R20.0 782.0    R20.2    8. Thyroid nodule  E04.1 241.0        Plan:       Cervical Cancer Screening -  Patient has had partial hysterectomy     Breast Cancer Screening - Mammogram 12/15/2022     Colon Cancer Screening - Colonoscopy up to Date    Eye Exam - Recommend annually.    Dental Exam - Recommend biannual exams.     Vaccinations -   Immunization History   Administered Date(s) Administered    COVID-19 Vaccine 08/10/2021, 08/31/2021    COVID-19, MRNA, LN-S, PF (Pfizer) (Purple Cap) 08/10/2021, 08/31/2021    Influenza - Trivalent - PF (ADULT) 10/05/2021          1. Wellness examination  Wellness: Five Rules Reviewed: Water/Nutrition-Real Food, Limit Fast Food/ Exercise 20-30 minutes most days of the week/ Mental health- "Is your work a calling or paycheck" Define 'What is your purpose-what matters to you' Stress- Is an Individual Response- Therefore Can be Controlled by the Individual. Meditation/ Immunizations/Multivitamin use-Vitamin D3/ Screenings      2. Essential hypertension  Patient has been taking medication. Blood pressure goal of less than 130/80-blood pressure is stable. Continue to encourage diet and activity modifications. Including stress management. Pathophysiology/treatment/dangers discussed.    - CBC Auto Differential; Future  - Comprehensive Metabolic Panel; Future    3. Mixed hyperlipidemia  Lab Results   Component Value Date    CHOL 192 05/17/2023    .00 05/17/2023    TRIG 224 (H) 05/17/2023    HDL 43 05/17/2023     Pathophysiology/treatment/dangers discussed. Patient to continue diet modification-medication-patient does not " want to make changes to the dosage of medication would like to try diet modification for six-months.   Total cholesterol 192 ( Goal less than 200) HDL 43 ( Goal high as possible)  ( Goal less than 100) Triglycerides 224 ( Goal less than 150)    - Lipid Panel; Future    4. Prediabetes  Hemoglobin A1c 5.4  Normal less than 5.7 - Diagnosis of Type 2 Diabetes Mellitus at 6.5. Encourage diet and activity modification- medication options discussed- Pathophysiology/treatment/dangers discussed.    - Hemoglobin A1C; Future    5. Migraine with aura and without status migrainosus, not intractable  Patient is currently stable.  No acute modifications recommended.  Continue with current treatment.    6. Seasonal allergies  Patient is currently stable.  No acute modifications recommended.  Continue with current treatment.    7. Numbness and tingling in left hand  Pathophysiology/Treatment/ Dangers Discussed.  Consistent with nerve entrapment referral to hand surgeon for further recommendations.  - Ambulatory referral/consult to Hand Surgery; Future    8. Thyroid nodule  Pathophysiology/Treatment/ Dangers Discussed.  Patient's ultrasound reports were not reviewed until May 23rd-patient referred to ENT for biopsy.  - Ambulatory referral/consult to ENT; Future  - TSH; Future          The patient's Health Maintenance was reviewed and the following appears to be due at this time:   Health Maintenance Due   Topic Date Due    Hepatitis C Screening  Never done    Cervical Cancer Screening  Never done    HIV Screening  Never done    TETANUS VACCINE  Never done    Shingles Vaccine (1 of 2) Never done    COVID-19 Vaccine (5 - Booster) 10/26/2021         Follow up in about 6 months (around 11/23/2023). In addition to their scheduled follow up, the patient has also been instructed to follow up on as needed basis.     Future Appointments   Date Time Provider Department Center   11/27/2023 11:30 AM Sera Allen MD [facility] [place]  Xuan Allen MD

## 2023-06-06 ENCOUNTER — OFFICE VISIT (OUTPATIENT)
Dept: SURGICAL ONCOLOGY | Facility: CLINIC | Age: 58
End: 2023-06-06
Payer: COMMERCIAL

## 2023-06-06 VITALS
HEIGHT: 63 IN | BODY MASS INDEX: 37.74 KG/M2 | TEMPERATURE: 98 F | DIASTOLIC BLOOD PRESSURE: 82 MMHG | HEART RATE: 57 BPM | WEIGHT: 213 LBS | SYSTOLIC BLOOD PRESSURE: 122 MMHG

## 2023-06-06 DIAGNOSIS — E04.1 THYROID NODULE: ICD-10-CM

## 2023-06-06 PROCEDURE — 1159F MED LIST DOCD IN RCRD: CPT | Mod: CPTII,S$GLB,, | Performed by: OTOLARYNGOLOGY

## 2023-06-06 PROCEDURE — 3079F PR MOST RECENT DIASTOLIC BLOOD PRESSURE 80-89 MM HG: ICD-10-PCS | Mod: CPTII,S$GLB,, | Performed by: OTOLARYNGOLOGY

## 2023-06-06 PROCEDURE — 1159F PR MEDICATION LIST DOCUMENTED IN MEDICAL RECORD: ICD-10-PCS | Mod: CPTII,S$GLB,, | Performed by: OTOLARYNGOLOGY

## 2023-06-06 PROCEDURE — 99204 PR OFFICE/OUTPT VISIT, NEW, LEVL IV, 45-59 MIN: ICD-10-PCS | Mod: S$GLB,,, | Performed by: OTOLARYNGOLOGY

## 2023-06-06 PROCEDURE — 99999 PR PBB SHADOW E&M-EST. PATIENT-LVL IV: CPT | Mod: PBBFAC,,, | Performed by: OTOLARYNGOLOGY

## 2023-06-06 PROCEDURE — 3008F BODY MASS INDEX DOCD: CPT | Mod: CPTII,S$GLB,, | Performed by: OTOLARYNGOLOGY

## 2023-06-06 PROCEDURE — 3079F DIAST BP 80-89 MM HG: CPT | Mod: CPTII,S$GLB,, | Performed by: OTOLARYNGOLOGY

## 2023-06-06 PROCEDURE — 99999 PR PBB SHADOW E&M-EST. PATIENT-LVL IV: ICD-10-PCS | Mod: PBBFAC,,, | Performed by: OTOLARYNGOLOGY

## 2023-06-06 PROCEDURE — 99204 OFFICE O/P NEW MOD 45 MIN: CPT | Mod: S$GLB,,, | Performed by: OTOLARYNGOLOGY

## 2023-06-06 PROCEDURE — 3074F PR MOST RECENT SYSTOLIC BLOOD PRESSURE < 130 MM HG: ICD-10-PCS | Mod: CPTII,S$GLB,, | Performed by: OTOLARYNGOLOGY

## 2023-06-06 PROCEDURE — 3008F PR BODY MASS INDEX (BMI) DOCUMENTED: ICD-10-PCS | Mod: CPTII,S$GLB,, | Performed by: OTOLARYNGOLOGY

## 2023-06-06 PROCEDURE — 3074F SYST BP LT 130 MM HG: CPT | Mod: CPTII,S$GLB,, | Performed by: OTOLARYNGOLOGY

## 2023-06-06 NOTE — PROGRESS NOTES
Chief Complaint   Patient presents with    New Patient     Thyroid nodule.         58 y.o. female presents for evaluation of a left thyroid nodule.  Recent thyroid ultrasound showed a 3.2 cm nodule that is dominant and solid in the left thyroid lobe, and fine-needle aspiration is recommended.    No family history of thyroid malignancy or external beam radiation          Past Medical History:   Diagnosis Date    Enlarged thyroid gland 2023    Essential hypertension 2022    Migraine with aura and without status migrainosus, not intractable 2023    Mixed hyperlipidemia 2022    Prediabetes 2022    Seasonal allergies 2022       Past Surgical History:   Procedure Laterality Date     SECTION      PARTIAL HYSTERECTOMY      REPAIR OF MENISCUS OF KNEE Left     TUBAL LIGATION         family history includes Diabetes in her mother; Multiple sclerosis in her sister; Prostate cancer (age of onset: 60) in her father; Rheum arthritis in her mother.    Pt  reports that she has never smoked. She has never used smokeless tobacco. She reports current alcohol use. She reports that she does not use drugs.    Review of patient's allergies indicates:   Allergen Reactions    Penicillins Rash        Physical Exam    Vitals:    23 0917   BP: 122/82   Pulse: (!) 57   Temp: 97.8 °F (36.6 °C)     Body mass index is 37.74 kg/m².    General: AOx3, NAD  Cardiac: Well perfused  Respiratory: Breathing without stridor or distress  Right Ear: External Auditory Canal WNL,TM w/o masses/lesions/perforations  Left Ear:  External Auditory Canal WNL,TM w/o masses/lesions/perforations  Nose: No gross nasal septal deviation.  Inferior Turbinates WNL bilaterally.  No septal perforation.  No masses/lesions.  Oral Cavity: FOM Soft, no masses palpated.  Oral Tongue mobile.  Hard Palate WNL.  Oropharynx: BOT WNL.  No masses/lesions noted.  Tonsillar fossa without lesions.  Soft palate without masses.  Midline uvula.  Neck:  No palpable lymphadenopathy at I - VI.  Palpable left thyroid  Face: House Brackmann I bilaterally.  Eyes: Normal extra ocular motion bilaterally.    Fine needle Aspiration:  Using ultrasound guidance, the left thyroid nodule was biopsied using Afirma.  She tolerated the procedure well without issue.      Assessment     1. Thyroid nodule          Plan  In summary, we performed ultrasound-guided fine-needle aspiration today with a firm.  I will see her back in a week or 2 to review the pathology and make a plan going forward.

## 2023-06-12 ENCOUNTER — HOSPITAL ENCOUNTER (OUTPATIENT)
Dept: RADIOLOGY | Facility: CLINIC | Age: 58
Discharge: HOME | End: 2023-06-12
Attending: STUDENT IN AN ORGANIZED HEALTH CARE EDUCATION/TRAINING PROGRAM
Payer: COMMERCIAL

## 2023-06-12 ENCOUNTER — OFFICE VISIT (OUTPATIENT)
Dept: ORTHOPEDICS | Facility: CLINIC | Age: 58
End: 2023-06-12
Payer: COMMERCIAL

## 2023-06-12 VITALS — BODY MASS INDEX: 39.2 KG/M2 | HEIGHT: 62 IN | WEIGHT: 213 LBS

## 2023-06-12 DIAGNOSIS — R20.0 NUMBNESS OF LEFT HAND: ICD-10-CM

## 2023-06-12 DIAGNOSIS — R20.2 NUMBNESS AND TINGLING IN LEFT HAND: ICD-10-CM

## 2023-06-12 DIAGNOSIS — M79.642 LEFT HAND PAIN: ICD-10-CM

## 2023-06-12 DIAGNOSIS — R20.0 NUMBNESS AND TINGLING IN LEFT HAND: ICD-10-CM

## 2023-06-12 DIAGNOSIS — M54.12 CERVICAL RADICULOPATHY: Primary | ICD-10-CM

## 2023-06-12 PROCEDURE — 99203 OFFICE O/P NEW LOW 30 MIN: CPT | Mod: ,,, | Performed by: STUDENT IN AN ORGANIZED HEALTH CARE EDUCATION/TRAINING PROGRAM

## 2023-06-12 PROCEDURE — 1159F PR MEDICATION LIST DOCUMENTED IN MEDICAL RECORD: ICD-10-PCS | Mod: CPTII,,, | Performed by: STUDENT IN AN ORGANIZED HEALTH CARE EDUCATION/TRAINING PROGRAM

## 2023-06-12 PROCEDURE — 72050 XR CERVICAL SPINE COMPLETE 5 VIEW: ICD-10-PCS | Mod: ,,, | Performed by: STUDENT IN AN ORGANIZED HEALTH CARE EDUCATION/TRAINING PROGRAM

## 2023-06-12 PROCEDURE — 3008F PR BODY MASS INDEX (BMI) DOCUMENTED: ICD-10-PCS | Mod: CPTII,,, | Performed by: STUDENT IN AN ORGANIZED HEALTH CARE EDUCATION/TRAINING PROGRAM

## 2023-06-12 PROCEDURE — 1159F MED LIST DOCD IN RCRD: CPT | Mod: CPTII,,, | Performed by: STUDENT IN AN ORGANIZED HEALTH CARE EDUCATION/TRAINING PROGRAM

## 2023-06-12 PROCEDURE — 73130 XR HAND COMPLETE 3 VIEW LEFT: ICD-10-PCS | Mod: LT,,, | Performed by: STUDENT IN AN ORGANIZED HEALTH CARE EDUCATION/TRAINING PROGRAM

## 2023-06-12 PROCEDURE — 72050 X-RAY EXAM NECK SPINE 4/5VWS: CPT | Mod: ,,, | Performed by: STUDENT IN AN ORGANIZED HEALTH CARE EDUCATION/TRAINING PROGRAM

## 2023-06-12 PROCEDURE — 73130 X-RAY EXAM OF HAND: CPT | Mod: LT,,, | Performed by: STUDENT IN AN ORGANIZED HEALTH CARE EDUCATION/TRAINING PROGRAM

## 2023-06-12 PROCEDURE — 3008F BODY MASS INDEX DOCD: CPT | Mod: CPTII,,, | Performed by: STUDENT IN AN ORGANIZED HEALTH CARE EDUCATION/TRAINING PROGRAM

## 2023-06-12 PROCEDURE — 99203 PR OFFICE/OUTPT VISIT, NEW, LEVL III, 30-44 MIN: ICD-10-PCS | Mod: ,,, | Performed by: STUDENT IN AN ORGANIZED HEALTH CARE EDUCATION/TRAINING PROGRAM

## 2023-06-12 NOTE — PROGRESS NOTES
Chief Complaint:  Left wrist and hand pain    Consulting Physician: Sera Allen MD    History of present illness:    Patient is a 58-year-old right-hand-dominant female who presents for initial evaluation of her left wrist and hand pain.  She states that this pain is a going on for several months.  She describes it as pain starting from the neck and radiating down into the arm elbow forearm hand and wrist.  She also complains of some numbness and tingling in the dorsum of the hand.  She has not done any treatment for this.  She has not tried therapy for this.  She has not had a EMG.  She has not tried an injection.  She denies any injury to the hand or wrist.  She also denies any injury to the neck.  She does have neck pain.  She has never had her cervical spine worked up in the past.    Past Medical History:   Diagnosis Date    Enlarged thyroid gland 2023    Essential hypertension 2022    Migraine with aura and without status migrainosus, not intractable 2023    Mixed hyperlipidemia 2022    Prediabetes 2022    Seasonal allergies 2022       Past Surgical History:   Procedure Laterality Date    APPENDECTOMY  over 30 years    was in for another surgery and dr decided to remove it     SECTION      HYSTERECTOMY      partial    PARTIAL HYSTERECTOMY      REPAIR OF MENISCUS OF KNEE Left     TUBAL LIGATION         Current Outpatient Medications   Medication Sig    albuterol (PROVENTIL/VENTOLIN HFA) 90 mcg/actuation inhaler 2 puff    amLODIPine (NORVASC) 5 MG tablet Take 1 tablet by mouth once daily.    atorvastatin (LIPITOR) 20 MG tablet TAKE 1 TABLET BY MOUTH EVERY DAY    fluticasone propionate (FLONASE) 50 mcg/actuation nasal spray 1 spray in each nostril    metFORMIN (GLUCOPHAGE) 500 MG tablet Take 500 mg by mouth.    montelukast (SINGULAIR) 10 mg tablet 1 tablet    rizatriptan (MAXALT-MLT) 10 MG disintegrating tablet 1 tablet on the tongue and allow to dissolve as needed one  "time     No current facility-administered medications for this visit.       Review of patient's allergies indicates:   Allergen Reactions    Penicillins Rash       Family History   Problem Relation Age of Onset    Diabetes Mother     Rheum arthritis Mother     Arthritis Mother     Hypertension Mother     Miscarriages / Stillbirths Mother     Vision loss Mother         due to diabetes    Prostate cancer Father 60    Multiple sclerosis Sister     Hypertension Maternal Grandmother     Stroke Maternal Grandmother        Social History     Socioeconomic History    Marital status:      Spouse name: Crispin    Number of children: 4   Occupational History    Occupation: Retired: [employer]   Tobacco Use    Smoking status: Never    Smokeless tobacco: Never   Substance and Sexual Activity    Alcohol use: Yes     Alcohol/week: 1.0 standard drink     Types: 1 Glasses of wine per week     Comment: likely once/twice every few months    Drug use: Never    Sexual activity: Yes     Partners: Male     Birth control/protection: See Surgical Hx     Comment: with spouse only   Social History Narrative    ** Merged History Encounter **         ** Merged History Encounter **            Review of Systems:    Constitution:   Denies chills, fever, and sweats.  HENT:   Denies headaches or blurry vision.  Cardiovascular:  Denies chest pain or irregular heart beat.  Respiratory:   Denies cough or shortness of breath.  Gastrointestinal:  Denies abdominal pain, nausea, or vomiting.  Musculoskeletal:   Denies muscle cramps.  Neurological:   Denies dizziness or focal weakness.  Psychiatric/Behavior: Normal mental status.  Hematology/Lymph:  Denies bleeding problem or easy bruising/bleeding.  Skin:    Denies rash or suspicious lesions.    Examination:    Vital Signs:    Vitals:    06/12/23 1106   Weight: 96.6 kg (213 lb)   Height: 5' 2" (1.575 m)       Body mass index is 38.96 kg/m².    Constitution:   Well-developed, well nourished " patient in no acute distress.  Neurological:   Alert and oriented x 3 and cooperative to examination.     Psychiatric/Behavior: Normal mental status.  Respiratory:   No shortness of breath.  Eyes:    Extraoccular muscles intact  Skin:    No scars, rash or suspicious lesions.    MSK:   Left upper extremity:  No open wounds or rashes.  No thenar hypothenar atrophy.  Two-point discrimination is 5 mm in all 5 digits.  She has full range of motion of the shoulder elbow wrist hand.  There is some limited range of motion of the cervical spine.  Spurling's and reverse Spurling's test are positive.  Apb strength is 5/5.  First dorsal interosseous is 5/5.  There is a Tinel over the median nerve at the wrist.  Phalen's test is positive.  Durkan's test is positive.  There is no Tinel's over the ulnar nerve at the elbow.  Cubital tunnel provocative maneuvers are negative.  Radial pulse 2 +the hand is warm well perfused    Imaging:   X-rays including five views of the cervical spine show degenerative changes but no fractures  X-ray left hand three views show some degenerative changes but no fractures or dislocations     Assessment:  Cervical radiculopathy, left carpal tunnel syndrome    Plan:  I think she has a component of left carpal tunnel syndrome however the majority wrist symptoms I think are coming from her cervical spine.  I will order MRI of the cervical spine as well as a EMG of bilateral upper extremities today.  Depending on what this shows I may refer her to 1 of our spine specialist to be evaluated.  She can try nighttime bracing for the carpal tunnel syndrome for now.  I can see her back after the EMG and nerve conduction studies are done    Follow Up:  After MRI any EMG  Xray at next visit:  None

## 2023-06-20 ENCOUNTER — OFFICE VISIT (OUTPATIENT)
Dept: SURGICAL ONCOLOGY | Facility: CLINIC | Age: 58
End: 2023-06-20
Payer: COMMERCIAL

## 2023-06-20 VITALS
HEART RATE: 69 BPM | SYSTOLIC BLOOD PRESSURE: 124 MMHG | BODY MASS INDEX: 39.56 KG/M2 | HEIGHT: 62 IN | WEIGHT: 215 LBS | DIASTOLIC BLOOD PRESSURE: 84 MMHG

## 2023-06-20 DIAGNOSIS — E04.1 LEFT THYROID NODULE: Primary | ICD-10-CM

## 2023-06-20 DIAGNOSIS — E04.1 LEFT THYROID NODULE: ICD-10-CM

## 2023-06-20 PROCEDURE — 3074F PR MOST RECENT SYSTOLIC BLOOD PRESSURE < 130 MM HG: ICD-10-PCS | Mod: CPTII,S$GLB,, | Performed by: OTOLARYNGOLOGY

## 2023-06-20 PROCEDURE — 1159F MED LIST DOCD IN RCRD: CPT | Mod: CPTII,S$GLB,, | Performed by: OTOLARYNGOLOGY

## 2023-06-20 PROCEDURE — 1159F PR MEDICATION LIST DOCUMENTED IN MEDICAL RECORD: ICD-10-PCS | Mod: CPTII,S$GLB,, | Performed by: OTOLARYNGOLOGY

## 2023-06-20 PROCEDURE — 3074F SYST BP LT 130 MM HG: CPT | Mod: CPTII,S$GLB,, | Performed by: OTOLARYNGOLOGY

## 2023-06-20 PROCEDURE — 99213 PR OFFICE/OUTPT VISIT, EST, LEVL III, 20-29 MIN: ICD-10-PCS | Mod: S$GLB,,, | Performed by: OTOLARYNGOLOGY

## 2023-06-20 PROCEDURE — 3079F DIAST BP 80-89 MM HG: CPT | Mod: CPTII,S$GLB,, | Performed by: OTOLARYNGOLOGY

## 2023-06-20 PROCEDURE — 3079F PR MOST RECENT DIASTOLIC BLOOD PRESSURE 80-89 MM HG: ICD-10-PCS | Mod: CPTII,S$GLB,, | Performed by: OTOLARYNGOLOGY

## 2023-06-20 PROCEDURE — 3008F BODY MASS INDEX DOCD: CPT | Mod: CPTII,S$GLB,, | Performed by: OTOLARYNGOLOGY

## 2023-06-20 PROCEDURE — 3008F PR BODY MASS INDEX (BMI) DOCUMENTED: ICD-10-PCS | Mod: CPTII,S$GLB,, | Performed by: OTOLARYNGOLOGY

## 2023-06-20 PROCEDURE — 99999 PR PBB SHADOW E&M-EST. PATIENT-LVL III: ICD-10-PCS | Mod: PBBFAC,,, | Performed by: OTOLARYNGOLOGY

## 2023-06-20 PROCEDURE — 99999 PR PBB SHADOW E&M-EST. PATIENT-LVL III: CPT | Mod: PBBFAC,,, | Performed by: OTOLARYNGOLOGY

## 2023-06-20 PROCEDURE — 99213 OFFICE O/P EST LOW 20 MIN: CPT | Mod: S$GLB,,, | Performed by: OTOLARYNGOLOGY

## 2023-06-20 NOTE — PROGRESS NOTES
Chief Complaint   Patient presents with    Follow-up     Thyroid results         58 y.o. female presents for follow-up and to review her recent biopsy.  We reviewed the results which show a benign hyperplastic/adenomatoid nodule.  No evidence of any malignancy.  She is doing well from a biopsy perspective.          Past Medical History:   Diagnosis Date    Enlarged thyroid gland 2023    Essential hypertension 2022    Migraine with aura and without status migrainosus, not intractable 2023    Mixed hyperlipidemia 2022    Prediabetes 2022    Seasonal allergies 2022       Past Surgical History:   Procedure Laterality Date    APPENDECTOMY  over 30 years    was in for another surgery and dr decided to remove it     SECTION      HYSTERECTOMY      partial    PARTIAL HYSTERECTOMY      REPAIR OF MENISCUS OF KNEE Left     TUBAL LIGATION         family history includes Arthritis in her mother; Diabetes in her mother; Hypertension in her maternal grandmother and mother; Miscarriages / Stillbirths in her mother; Multiple sclerosis in her sister; Prostate cancer (age of onset: 60) in her father; Rheum arthritis in her mother; Stroke in her maternal grandmother; Vision loss in her mother.    Pt  reports that she has never smoked. She has never used smokeless tobacco. She reports current alcohol use of about 1.0 standard drink per week. She reports that she does not use drugs.    Review of patient's allergies indicates:   Allergen Reactions    Penicillins Rash        Physical Exam    Vitals:    23 0826   BP: 124/84   Pulse: 69     Body mass index is 39.32 kg/m².    General: AOx3, NAD  Cardiac: Well perfused  Respiratory: Breathing without stridor or distress  Right Ear: External Auditory Canal WNL,TM w/o masses/lesions/perforations  Left Ear:  External Auditory Canal WNL,TM w/o masses/lesions/perforations  Nose: No gross nasal septal deviation.  Inferior Turbinates WNL bilaterally.  No  septal perforation.  No masses/lesions.  Oral Cavity: FOM Soft, no masses palpated.  Oral Tongue mobile.  Hard Palate WNL.  Oropharynx: BOT WNL.  No masses/lesions noted.  Tonsillar fossa without lesions.  Soft palate without masses.  Midline uvula.  Neck: No palpable lymphadenopathy at I - VI.  Mild fullness palpated in the left lobe of the thyroid  Face: House Brackmann I bilaterally.  Eyes: Normal extra ocular motion bilaterally.          Assessment     1. Left thyroid nodule          Plan  In summary, we will repeat the ultrasound in 1 year and I will see her afterwards.  She will let me know if there are any changes in the interim.

## 2023-07-03 ENCOUNTER — OFFICE VISIT (OUTPATIENT)
Dept: ORTHOPEDICS | Facility: CLINIC | Age: 58
End: 2023-07-03
Payer: COMMERCIAL

## 2023-07-03 VITALS
SYSTOLIC BLOOD PRESSURE: 142 MMHG | HEIGHT: 62 IN | DIASTOLIC BLOOD PRESSURE: 94 MMHG | BODY MASS INDEX: 39.56 KG/M2 | WEIGHT: 215 LBS | HEART RATE: 60 BPM

## 2023-07-03 DIAGNOSIS — M54.12 CERVICAL RADICULOPATHY: ICD-10-CM

## 2023-07-03 DIAGNOSIS — R20.2 NUMBNESS AND TINGLING IN LEFT HAND: Primary | ICD-10-CM

## 2023-07-03 DIAGNOSIS — G56.02 LEFT CARPAL TUNNEL SYNDROME: ICD-10-CM

## 2023-07-03 DIAGNOSIS — R20.0 NUMBNESS AND TINGLING IN LEFT HAND: Primary | ICD-10-CM

## 2023-07-03 PROCEDURE — 99214 OFFICE O/P EST MOD 30 MIN: CPT | Mod: 25,,, | Performed by: STUDENT IN AN ORGANIZED HEALTH CARE EDUCATION/TRAINING PROGRAM

## 2023-07-03 PROCEDURE — 20526 THER INJECTION CARP TUNNEL: CPT | Mod: LT,,, | Performed by: STUDENT IN AN ORGANIZED HEALTH CARE EDUCATION/TRAINING PROGRAM

## 2023-07-03 PROCEDURE — 3008F BODY MASS INDEX DOCD: CPT | Mod: CPTII,,, | Performed by: STUDENT IN AN ORGANIZED HEALTH CARE EDUCATION/TRAINING PROGRAM

## 2023-07-03 PROCEDURE — 3077F SYST BP >= 140 MM HG: CPT | Mod: CPTII,,, | Performed by: STUDENT IN AN ORGANIZED HEALTH CARE EDUCATION/TRAINING PROGRAM

## 2023-07-03 PROCEDURE — 99214 PR OFFICE/OUTPT VISIT, EST, LEVL IV, 30-39 MIN: ICD-10-PCS | Mod: 25,,, | Performed by: STUDENT IN AN ORGANIZED HEALTH CARE EDUCATION/TRAINING PROGRAM

## 2023-07-03 PROCEDURE — 20526 PR INJECT CARPAL TUNNEL: ICD-10-PCS | Mod: LT,,, | Performed by: STUDENT IN AN ORGANIZED HEALTH CARE EDUCATION/TRAINING PROGRAM

## 2023-07-03 PROCEDURE — 3080F DIAST BP >= 90 MM HG: CPT | Mod: CPTII,,, | Performed by: STUDENT IN AN ORGANIZED HEALTH CARE EDUCATION/TRAINING PROGRAM

## 2023-07-03 PROCEDURE — 3080F PR MOST RECENT DIASTOLIC BLOOD PRESSURE >= 90 MM HG: ICD-10-PCS | Mod: CPTII,,, | Performed by: STUDENT IN AN ORGANIZED HEALTH CARE EDUCATION/TRAINING PROGRAM

## 2023-07-03 PROCEDURE — 3077F PR MOST RECENT SYSTOLIC BLOOD PRESSURE >= 140 MM HG: ICD-10-PCS | Mod: CPTII,,, | Performed by: STUDENT IN AN ORGANIZED HEALTH CARE EDUCATION/TRAINING PROGRAM

## 2023-07-03 PROCEDURE — 1159F MED LIST DOCD IN RCRD: CPT | Mod: CPTII,,, | Performed by: STUDENT IN AN ORGANIZED HEALTH CARE EDUCATION/TRAINING PROGRAM

## 2023-07-03 PROCEDURE — 1159F PR MEDICATION LIST DOCUMENTED IN MEDICAL RECORD: ICD-10-PCS | Mod: CPTII,,, | Performed by: STUDENT IN AN ORGANIZED HEALTH CARE EDUCATION/TRAINING PROGRAM

## 2023-07-03 PROCEDURE — 3008F PR BODY MASS INDEX (BMI) DOCUMENTED: ICD-10-PCS | Mod: CPTII,,, | Performed by: STUDENT IN AN ORGANIZED HEALTH CARE EDUCATION/TRAINING PROGRAM

## 2023-07-03 RX ADMIN — BETAMETHASONE SODIUM PHOSPHATE AND BETAMETHASONE ACETATE 6 MG: 3; 3 INJECTION, SUSPENSION INTRA-ARTICULAR; INTRALESIONAL; INTRAMUSCULAR; SOFT TISSUE at 09:07

## 2023-07-03 RX ADMIN — LIDOCAINE HYDROCHLORIDE 1 ML: 10 INJECTION INFILTRATION; PERINEURAL at 09:07

## 2023-07-04 RX ORDER — LIDOCAINE HYDROCHLORIDE 10 MG/ML
1 INJECTION INFILTRATION; PERINEURAL
Status: DISCONTINUED | OUTPATIENT
Start: 2023-07-03 | End: 2023-07-04 | Stop reason: HOSPADM

## 2023-07-04 RX ORDER — BETAMETHASONE SODIUM PHOSPHATE AND BETAMETHASONE ACETATE 3; 3 MG/ML; MG/ML
6 INJECTION, SUSPENSION INTRA-ARTICULAR; INTRALESIONAL; INTRAMUSCULAR; SOFT TISSUE
Status: DISCONTINUED | OUTPATIENT
Start: 2023-07-03 | End: 2023-07-04 | Stop reason: HOSPADM

## 2023-07-04 NOTE — PROCEDURES
Carpal Tunnel    Date/Time: 7/3/2023 9:45 AM  Performed by: Don Arana MD  Authorized by: Don Arana MD     Consent Done?:  Yes (Verbal)  Indications:  Pain and diagnostic evaluation  Timeout: prior to procedure the correct patient, procedure, and site was verified    Location:  Wrist  Ultrasonic Guidance for Needle Placement?: No    Needle size:  25 G  Approach:  Volar  Medications:  1 mL LIDOcaine HCL 10 mg/ml (1%) 10 mg/mL (1 %); 6 mg betamethasone acetate-betamethasone sodium phosphate 6 mg/mL  Patient tolerance:  Patient tolerated the procedure well with no immediate complications

## 2023-07-04 NOTE — PROGRESS NOTES
Chief Complaint:  Left wrist and hand pain    Consulting Physician: No ref. provider found    History of present illness:    Patient is a 58-year-old right-hand-dominant female who presents for follow up evaluation of her left wrist and hand pain.  She states that this pain is a going on for several months.  She describes it as pain starting from the neck and radiating down into the arm elbow forearm hand and wrist.  She also complains of some numbness and tingling in the dorsum of the hand.  I saw her last when I sent her for a MRI C spine and EMG. I suspected that the majority of her symptoms were coming from her C spine. Since I saw her last, she has had significantly more neck pain. The numbness in her hand is the same.     Past Medical History:   Diagnosis Date    Enlarged thyroid gland 2023    Essential hypertension 2022    Migraine with aura and without status migrainosus, not intractable 2023    Mixed hyperlipidemia 2022    Prediabetes 2022    Seasonal allergies 2022       Past Surgical History:   Procedure Laterality Date    APPENDECTOMY  over 30 years    was in for another surgery and dr decided to remove it     SECTION      HYSTERECTOMY      partial    PARTIAL HYSTERECTOMY      REPAIR OF MENISCUS OF KNEE Left     TUBAL LIGATION         Current Outpatient Medications   Medication Sig    albuterol (PROVENTIL/VENTOLIN HFA) 90 mcg/actuation inhaler 2 puff    amLODIPine (NORVASC) 5 MG tablet Take 1 tablet by mouth once daily.    atorvastatin (LIPITOR) 20 MG tablet TAKE 1 TABLET BY MOUTH EVERY DAY    fluticasone propionate (FLONASE) 50 mcg/actuation nasal spray 1 spray in each nostril    metFORMIN (GLUCOPHAGE) 500 MG tablet Take 500 mg by mouth.    montelukast (SINGULAIR) 10 mg tablet 1 tablet    rizatriptan (MAXALT-MLT) 10 MG disintegrating tablet 1 tablet on the tongue and allow to dissolve as needed one time     No current facility-administered medications for this  "visit.       Review of patient's allergies indicates:   Allergen Reactions    Penicillins Rash       Family History   Problem Relation Age of Onset    Diabetes Mother     Rheum arthritis Mother     Arthritis Mother     Hypertension Mother     Miscarriages / Stillbirths Mother     Vision loss Mother         due to diabetes    Prostate cancer Father 60    Multiple sclerosis Sister     Hypertension Maternal Grandmother     Stroke Maternal Grandmother        Social History     Socioeconomic History    Marital status:      Spouse name: Crispin    Number of children: 4   Occupational History    Occupation: Retired: [employer]   Tobacco Use    Smoking status: Never    Smokeless tobacco: Never   Substance and Sexual Activity    Alcohol use: Yes     Alcohol/week: 1.0 standard drink     Types: 1 Glasses of wine per week     Comment: likely once/twice every few months    Drug use: Never    Sexual activity: Yes     Partners: Male     Birth control/protection: See Surgical Hx     Comment: with spouse only   Social History Narrative    ** Merged History Encounter **         ** Merged History Encounter **            Review of Systems:    Constitution:   Denies chills, fever, and sweats.  HENT:   Denies headaches or blurry vision.  Cardiovascular:  Denies chest pain or irregular heart beat.  Respiratory:   Denies cough or shortness of breath.  Gastrointestinal:  Denies abdominal pain, nausea, or vomiting.  Musculoskeletal:   Denies muscle cramps.  Neurological:   Denies dizziness or focal weakness.  Psychiatric/Behavior: Normal mental status.  Hematology/Lymph:  Denies bleeding problem or easy bruising/bleeding.  Skin:    Denies rash or suspicious lesions.    Examination:    Vital Signs:    Vitals:    07/03/23 1034   BP: (!) 142/94   Pulse: 60   Weight: 97.5 kg (215 lb)   Height: 5' 2" (1.575 m)       Body mass index is 39.32 kg/m².    Constitution:   Well-developed, well nourished patient in no acute " distress.  Neurological:   Alert and oriented x 3 and cooperative to examination.     Psychiatric/Behavior: Normal mental status.  Respiratory:   No shortness of breath.  Eyes:    Extraoccular muscles intact  Skin:    No scars, rash or suspicious lesions.    MSK:   Left upper extremity:  No open wounds or rashes.  No thenar hypothenar atrophy.  Two-point discrimination is 5 mm in all 5 digits.  She has full range of motion of the shoulder elbow wrist hand.  There is some limited range of motion of the cervical spine.  Spurling's and reverse Spurling's test are positive.  Apb strength is 5/5.  First dorsal interosseous is 5/5.  There is a Tinel over the median nerve at the wrist.  Phalen's test is positive.  Durkan's test is positive.  There is no Tinel's over the ulnar nerve at the elbow.  Cubital tunnel provocative maneuvers are negative.  Radial pulse 2 +the hand is warm well perfused    Imaging:   MRI of C spine shows Cervical spondylosis and facet disease with neural foraminal narrowing at multiple levels    EMG shows mild left carpal tunnel syndrome with chronic C7 radiculopathy      Assessment:  Cervical radiculopathy, left carpal tunnel syndrome    Plan:  I will give her an injection into the carpal tunnel today as a diagnostic and therapeutic treatment. I will also send her to Dr. Alas to be evaluated for C spine pathology     Follow Up:  3 months   Xray at next visit:  None

## 2023-07-05 ENCOUNTER — TELEPHONE (OUTPATIENT)
Dept: NEUROSURGERY | Facility: CLINIC | Age: 58
End: 2023-07-05
Payer: COMMERCIAL

## 2023-07-05 RX ORDER — MONTELUKAST SODIUM 10 MG/1
TABLET ORAL
Qty: 90 TABLET | Refills: 1 | Status: SHIPPED | OUTPATIENT
Start: 2023-07-05 | End: 2023-08-10 | Stop reason: SDUPTHER

## 2023-07-05 NOTE — TELEPHONE ENCOUNTER
The patient is being referred to Dr. Alas by Dr. Arana for cervical radiculopathy.  The patients MRI cervical shows shallow central disc protrusion extends 2-3 mm posteriorly and abuts the ventral cord at C3-4.  Should the patient see Dr. Alas next available or soon?  Please advise...BH

## 2023-07-05 NOTE — TELEPHONE ENCOUNTER
Patient requesting a refill on her Singulair 10mg, and Rizatriptan 10mg to be sent to the [facility]

## 2023-07-06 NOTE — TELEPHONE ENCOUNTER
I reviewed Ms. Gudino's MRI cervical spine without gadolinium that was completed on 06/15/2023.  There is mild kyphosis with normal alignment.  There are disc osteophyte complexes at C3-4 and C4-5 with mild-to-moderate bilateral neuroforaminal narrowing.  There is no significant central stenosis.      Ms. Gudino is appropriate for being evaluated in my neurosurgery clinic on a routine basis.

## 2023-07-10 NOTE — TELEPHONE ENCOUNTER
Does she need to be scheduled sooner rather than later? Or can she be scheduled for next available with Dr. Alas which is not until the end of November?

## 2023-07-11 RX ORDER — RIZATRIPTAN BENZOATE 10 MG/1
TABLET, ORALLY DISINTEGRATING ORAL
Qty: 12 TABLET | Refills: 1 | Status: SHIPPED | OUTPATIENT
Start: 2023-07-11

## 2023-07-14 NOTE — TELEPHONE ENCOUNTER
""Ms. Gudino is appropriate for being evaluated in my neurosurgery clinic on a routine basis."     She can be put on a wait list if the patient requests an earlier appointment.  "

## 2023-07-20 NOTE — TELEPHONE ENCOUNTER
Patient is scheduled with Dr. Alas on December 12th @ 10:00 AM. Advised the patient that she is on the waiting list.

## 2023-07-26 ENCOUNTER — OFFICE VISIT (OUTPATIENT)
Dept: FAMILY MEDICINE | Facility: CLINIC | Age: 58
End: 2023-07-26
Payer: COMMERCIAL

## 2023-07-26 VITALS
DIASTOLIC BLOOD PRESSURE: 73 MMHG | OXYGEN SATURATION: 100 % | HEART RATE: 61 BPM | HEIGHT: 62 IN | TEMPERATURE: 98 F | BODY MASS INDEX: 39.2 KG/M2 | SYSTOLIC BLOOD PRESSURE: 125 MMHG | WEIGHT: 213 LBS

## 2023-07-26 DIAGNOSIS — R11.0 NAUSEA: ICD-10-CM

## 2023-07-26 DIAGNOSIS — I83.91 VARICOSE VEINS OF RIGHT LOWER EXTREMITY, UNSPECIFIED WHETHER COMPLICATED: Primary | ICD-10-CM

## 2023-07-26 PROCEDURE — 99213 PR OFFICE/OUTPT VISIT, EST, LEVL III, 20-29 MIN: ICD-10-PCS | Mod: ,,, | Performed by: FAMILY MEDICINE

## 2023-07-26 PROCEDURE — 1160F RVW MEDS BY RX/DR IN RCRD: CPT | Mod: CPTII,,, | Performed by: FAMILY MEDICINE

## 2023-07-26 PROCEDURE — 3008F PR BODY MASS INDEX (BMI) DOCUMENTED: ICD-10-PCS | Mod: CPTII,,, | Performed by: FAMILY MEDICINE

## 2023-07-26 PROCEDURE — 3078F PR MOST RECENT DIASTOLIC BLOOD PRESSURE < 80 MM HG: ICD-10-PCS | Mod: CPTII,,, | Performed by: FAMILY MEDICINE

## 2023-07-26 PROCEDURE — 3074F PR MOST RECENT SYSTOLIC BLOOD PRESSURE < 130 MM HG: ICD-10-PCS | Mod: CPTII,,, | Performed by: FAMILY MEDICINE

## 2023-07-26 PROCEDURE — 3078F DIAST BP <80 MM HG: CPT | Mod: CPTII,,, | Performed by: FAMILY MEDICINE

## 2023-07-26 PROCEDURE — 3044F HG A1C LEVEL LT 7.0%: CPT | Mod: CPTII,,, | Performed by: FAMILY MEDICINE

## 2023-07-26 PROCEDURE — 3074F SYST BP LT 130 MM HG: CPT | Mod: CPTII,,, | Performed by: FAMILY MEDICINE

## 2023-07-26 PROCEDURE — 1159F PR MEDICATION LIST DOCUMENTED IN MEDICAL RECORD: ICD-10-PCS | Mod: CPTII,,, | Performed by: FAMILY MEDICINE

## 2023-07-26 PROCEDURE — 3044F PR MOST RECENT HEMOGLOBIN A1C LEVEL <7.0%: ICD-10-PCS | Mod: CPTII,,, | Performed by: FAMILY MEDICINE

## 2023-07-26 PROCEDURE — 1160F PR REVIEW ALL MEDS BY PRESCRIBER/CLIN PHARMACIST DOCUMENTED: ICD-10-PCS | Mod: CPTII,,, | Performed by: FAMILY MEDICINE

## 2023-07-26 PROCEDURE — 1159F MED LIST DOCD IN RCRD: CPT | Mod: CPTII,,, | Performed by: FAMILY MEDICINE

## 2023-07-26 PROCEDURE — 99213 OFFICE O/P EST LOW 20 MIN: CPT | Mod: ,,, | Performed by: FAMILY MEDICINE

## 2023-07-26 PROCEDURE — 3008F BODY MASS INDEX DOCD: CPT | Mod: CPTII,,, | Performed by: FAMILY MEDICINE

## 2023-07-26 RX ORDER — OMEPRAZOLE 40 MG/1
40 CAPSULE, DELAYED RELEASE ORAL DAILY
Qty: 30 CAPSULE | Refills: 1 | Status: SHIPPED | OUTPATIENT
Start: 2023-07-26 | End: 2024-01-03

## 2023-07-26 NOTE — PROGRESS NOTES
Patient ID: [id number]     Chief Complaint: Veins/Nausea    HPI:     Rae Gudino is a 58 y.o. female here today for acute visit.  Since last visit patient has been evaluated by hand surgeon, neurologist, and has pending appointment with neurosurgeon.  Patient has concerns about the number of appointments and need for surgical evaluation.  Concerns address with patient.   1) Was evaluated by ENT regarding her thyroid nodule-biopsy was negative-patient was informed that she would need to repeat ultrasound in 1 year.  2) Has veins in her legs that are swollen-does not cause pain-but does cause sensation of movement.  Recently more noticeable-not associated with chest pain/shortness of breath/visual changes/changes in activity or energy.   3) Is also experiencing episodes of nausea-not sure if it is related to diet or activity-feels that it is more noticeable after she has been outside-not associated with bitter taste in her mouth/epigastric pain/chest pain-no recent changes to appetite or energy.      Past Medical History:   Diagnosis Date    Essential hypertension 2022    Migraine with aura and without status migrainosus, not intractable 2023    Mixed hyperlipidemia 2022    Prediabetes 2022    Seasonal allergies 2022        Past Surgical History:   Procedure Laterality Date    APPENDECTOMY  over 30 years    was in for another surgery and dr decided to remove it     SECTION      HYSTERECTOMY      partial    PARTIAL HYSTERECTOMY      REPAIR OF MENISCUS OF KNEE Left     TUBAL LIGATION          Social History     Tobacco Use    Smoking status: Never    Smokeless tobacco: Never   Substance Use Topics    Alcohol use: Yes     Alcohol/week: 1.0 standard drink     Types: 1 Glasses of wine per week     Comment: likely once/twice every few months    Drug use: Never        Social History     Socioeconomic History    Marital status:      Spouse name: Crispin    Number of children: 4  "       Current Outpatient Medications   Medication Instructions    albuterol (PROVENTIL/VENTOLIN HFA) 90 mcg/actuation inhaler 2 puff    amLODIPine (NORVASC) 5 MG tablet 1 tablet, Oral, Daily    atorvastatin (LIPITOR) 20 MG tablet TAKE 1 TABLET BY MOUTH EVERY DAY    fluticasone propionate (FLONASE) 50 mcg/actuation nasal spray 1 spray in each nostril    metFORMIN (GLUCOPHAGE) 500 mg, Oral    montelukast (SINGULAIR) 10 mg tablet 1 tablet    omeprazole (PRILOSEC) 40 mg, Oral, Daily    rizatriptan (MAXALT-MLT) 10 MG disintegrating tablet DISSOLVE 1 TABLET ON THE TONGUE AND ALLOW TO DISSOLVE AS NEEDED ONE TIME DAILY       Review of patient's allergies indicates:   Allergen Reactions    Penicillins Rash        Patient Care Team:  Sera Allen MD as PCP - General (Family Medicine)       Subjective:     Review of Systems    12 point review of systems conducted, negative except as stated in the history of present illness. See HPI for details.      Objective:     Visit Vitals  /73   Pulse 61   Temp 97.9 °F (36.6 °C)   Ht 5' 2" (1.575 m)   Wt 96.6 kg (213 lb)   SpO2 100%   BMI 38.96 kg/m²       Physical Exam    General: Alert and oriented, No acute distress.  Head: Normocephalic, Atraumatic.  Eye: Pupils are equal, round and reactive to light, Extraocular movements are intact, Sclera non-icteric.  Ears/Nose/Throat: Normal, Mucosa moist,Clear.  Neck/Thyroid: Supple, Non-tender,Full range of motion.  Respiratory: Clear to auscultation bilaterally  Cardiovascular: Regular rate and rhythm, S1/S2 normal, No murmurs, rubs or gallops.  Gastrointestinal: Soft, Non-tender, Non-distended, Normal bowel sounds, No palpable organomegaly.  Musculoskeletal: Normal range of motion.  Integumentary: Warm, Dry, Intact, area on the leg consistent with varicose vein  Extremities: No clubbing, cyanosis or edema  Neurologic: No focal deficits, Cranial Nerves II-XII are grossly intact  Psychiatric: Normal interaction, Coherent speech, " Euthymic mood, Appropriate affect       Assessment:       ICD-10-CM ICD-9-CM   1. Varicose veins of right lower extremity, unspecified whether complicated  I83.91 454.9   2. Nausea  R11.0 787.02        Plan:     1. Varicose veins of right lower extremity, unspecified whether complicated  Pathophysiology/Treatment/ Dangers Discussed.  Patient would like to continue to monitor.    2. Nausea  Pathophysiology/Treatment/ Dangers Discussed.  Patient to try PPI call office if worsening of symptoms or no improvement.  - omeprazole (PRILOSEC) 40 MG capsule; Take 1 capsule (40 mg total) by mouth once daily.  Dispense: 30 capsule; Refill: 1         Follow up if symptoms worsen or fail to improve. In addition to their scheduled follow up, the patient has also been instructed to follow up on as needed basis.     Future Appointments   Date Time Provider Department Center   11/27/2023 11:30 AM MD [facility]   12/12/2023 10:00 AM [facility] [place]   5/28/2024  9:30 AM MD [facility]   6/25/2024 10:00 AM Darius Winetr Jr., MD [facility] [address] [facility]        Sera Allen MD

## 2023-08-10 DIAGNOSIS — J30.2 SEASONAL ALLERGIES: ICD-10-CM

## 2023-08-10 DIAGNOSIS — E78.2 MIXED HYPERLIPIDEMIA: Primary | ICD-10-CM

## 2023-08-10 RX ORDER — ATORVASTATIN CALCIUM 20 MG/1
20 TABLET, FILM COATED ORAL DAILY
Qty: 90 TABLET | Refills: 3 | Status: SHIPPED | OUTPATIENT
Start: 2023-08-10 | End: 2023-10-26

## 2023-08-10 RX ORDER — MONTELUKAST SODIUM 10 MG/1
TABLET ORAL
Qty: 90 TABLET | Refills: 1 | Status: SHIPPED | OUTPATIENT
Start: 2023-08-10

## 2023-09-20 ENCOUNTER — DOCUMENTATION ONLY (OUTPATIENT)
Dept: SURGICAL ONCOLOGY | Facility: CLINIC | Age: 58
End: 2023-09-20

## 2023-09-20 NOTE — PROGRESS NOTES
Called [facility] to check if pt US thyroid has been scheduled for 2024 and spoke with Sivan. She stated pt appt is scheduled for June 18, 2024 at 9 am. gingerc

## 2023-10-22 NOTE — PROGRESS NOTES
"[facility]   Neurosurgery      Raemonserrat Gudino  MRN: [id number], CSN: [id number]      : 1965   Age: 58 y.o. female  Payor: [facility] SHIELD / Plan: BCBS ALL OUT OF STATE / Product Type: PPO /       Ref:  Don Arana MD  [id number]  [place] [address]    PCP: Sera Allen MD    Visit Date: 10/26/2023     Patient Active Problem List   Diagnosis    Essential hypertension    Mixed hyperlipidemia    Prediabetes    Seasonal allergies    Migraine with aura and without status migrainosus, not intractable    Left thyroid nodule       SUBJECTIVE:      CC:   Chief Complaint   Patient presents with    mild, chronic intermittent R neck and axial low back pain       HPI:   Ms. Gudino is a 58 y.o. right-handed female who has a past medical history significant for hypertension and migraine headache.  She presents as a new patient in the neurosurgery clinic for mild, chronic intermittent right neck pain and axial low back pain.  The patient has also been experiencing chronic left-greater-than-right hand numbness for several months.    The patient has mild, chronic intermittent pain neck and lower back, with a current pain level of 1/10.  She sometimes has a sensation of a "knot" in the upper aspect of neck that has pain when she presses in this area.   Low back pain is intermittent and mild.  Ms. Gudino does not have any radiating pain into her extremities and does not have any focal weakness.  She has constant numbness in her left-greater-than-right forearms and hands involving all of her fingers.  The patient does not have any wrist pain.  Her numbness symptoms are most noticeable when waking up from sleep, when her hands are also swollen.  She occasionally drops objects when holding objects.  The patient has been fully ambulatory with no bowel or bladder incontinence.    Ms. Gudino was referred by orthopedic hand specialist Dr. Arana, who is following her for bilateral " carpal tunnel syndrome, which has been confirmed by a recent EMG/NCV study of her bilateral upper extremities.  She completed a cortisone shot in her left wrist for carpal tunnel syndrome, which was very helpful in reducing the severe painful numbness that she was experiencing while driving.  The patient has tried using a left wrist splint at night, which has not been effective in reducing her symptoms.    She does not take any pain medications.  Ms. Gudino has not participated in physical therapy and is not established with a pain management specialist.      Patient Active Problem List    Diagnosis Date Noted    Left thyroid nodule 2023    Migraine with aura and without status migrainosus, not intractable 2023    Essential hypertension 2022    Mixed hyperlipidemia 2022    Prediabetes 2022    Seasonal allergies 2022     Past Medical History:   Diagnosis Date    Cervical radiculopathy     Concussion without loss of consciousness     Dizziness and giddiness     Enlarged thyroid gland     Essential hypertension 2022    Left carpal tunnel syndrome     Migraine with aura and without status migrainosus, not intractable 2023    Mixed hyperlipidemia 2022    Muscle spasm     Nausea     Numbness and tingling of left hand     Prediabetes 2022    Seasonal allergies 2022    Thyroid nodule     Unspecified injury of head, initial encounter     Varicose veins of right lower extremity, unspecified whether complicated      Past Surgical History:   Procedure Laterality Date    APPENDECTOMY  over 30 years    was in for another surgery and dr decided to remove it     SECTION      gallstone removal      HYSTERECTOMY      partial    PARTIAL HYSTERECTOMY      REPAIR OF MENISCUS OF KNEE Left     TUBAL LIGATION         Current Outpatient Medications:     acetaminophen/caffeine (EXCEDRIN TENSION HEADACHE ORAL), Take by mouth., Disp: , Rfl:     albuterol  (PROVENTIL/VENTOLIN HFA) 90 mcg/actuation inhaler, 2 puff, Disp: , Rfl:     amLODIPine (NORVASC) 5 MG tablet, Take 1 tablet by mouth once daily., Disp: , Rfl:     fluticasone propionate (FLONASE) 50 mcg/actuation nasal spray, 1 spray in each nostril, Disp: , Rfl:     metFORMIN (GLUCOPHAGE) 500 MG tablet, Take 500 mg by mouth., Disp: , Rfl:     montelukast (SINGULAIR) 10 mg tablet, 1 tablet, Disp: 90 tablet, Rfl: 1    omeprazole (PRILOSEC) 40 MG capsule, Take 1 capsule (40 mg total) by mouth once daily., Disp: 30 capsule, Rfl: 1    rizatriptan (MAXALT-MLT) 10 MG disintegrating tablet, DISSOLVE 1 TABLET ON THE TONGUE AND ALLOW TO DISSOLVE AS NEEDED ONE TIME DAILY, Disp: 12 tablet, Rfl: 1    gabapentin (NEURONTIN) 300 MG capsule, Take 1 capsule (300 mg total) by mouth 3 (three) times daily., Disp: 90 capsule, Rfl: 1    Review of patient's allergies indicates:   Allergen Reactions    Penicillins Rash       Social History     Tobacco Use    Smoking status: Never    Smokeless tobacco: Never   Substance Use Topics    Alcohol use: Yes     Alcohol/week: 1.0 standard drink of alcohol     Types: 1 Glasses of wine per week     Comment: likely once/twice every few months     Occupation: Retired, previously worked as a  in the [employer]    Family History   Problem Relation Age of Onset    Diabetes Mother     Rheum arthritis Mother     Arthritis Mother     Hypertension Mother     Miscarriages / Stillbirths Mother     Vision loss Mother         due to diabetes    Prostate cancer Father 60    Multiple sclerosis Sister     Hypertension Maternal Grandmother     Stroke Maternal Grandmother        ROS:  Constitutional:  Negative for chills and fever.   HENT:  Negative for congestion and sore throat.    Eyes:  Negative for blurred vision and double vision.   Respiratory:  Positive for shortness of breath, Negative for cough.  Cardiovascular:  Negative for chest pain and palpitations.  "  Gastrointestinal:  Negative for constipation, diarrhea, nausea and vomiting.   Musculoskeletal:  Positive for neck pain and back pain  Neurological:  Positive for sensory change, Negative for focal weakness and headaches.   Endo/Heme/Allergies:  Does not bruise/bleed easily.   Psychiatric/Behavioral:  Negative for depression and anxiety.      OBJECTIVE:     EXAMINATION:  /79   Pulse 64   Resp 16   Ht 5' 2" (1.575 m)   Wt 96.3 kg (212 lb 6.4 oz)   BMI 38.85 kg/m²   Body Habitus: Moderately Obese    Physical Exam:  Constitutional: The patient is well-developed and cooperative, sitting comfortably in a chair.  During the evaluation, she repeatedly shakes her bilateral hands due to numbness.    Mental Status:   Oriented to person, place, and time  Normal speech    Cranial nerves:  CN II: PERRL, 4 to 3 mm, brisk bilaterally  CN III, IV, and VI: extraocular movements normal, no ptosis  CN V: normal facial sensation and masseter function  CN VII: facial strength normal and symmetrical  CN VIII: hearing normal bilaterally  CN IX and X: swallowing and phonation normal  CN XI: shoulder shrug intact bilaterally  CN XII: tongue protrusion midline    Motor:  Muscle bulk: normal in all extremities  Tone: normal in all extremities    Upper extremities:  Deltoid: right 5/5; left 5/5  Biceps: right 5/5; left 5/5  Triceps: right 5/5; left 5/5  Wrist extensors: right 5/5; left 5/5  Wrist flexors: right 5/5; left 5/5  : right 5/5; left 5/5  Interosseous muscles: right 5/5; left 5/5    Lower extremities:  Hip flexors: right 5/5; left 5/5  Knee extensors: right 5/5; left 5/5  Knee flexors: right 5/5; left 5/5  Foot dorsiflexors: right 5/5; left 5/5  Foot plantar flexors: right 5/5; left 5/5  Extensor hallucis longus: right 5/5; left 5/5    Sensation:  Decreased sensation to light touch in bilateral forearms, hands, and all fingers  Normal to light touch in b LE    Peripheral nerve neuro exam:  Normal Tinnel's " bilaterally  Normal Phallen's bilaterally  No elbow pain with tapping bilaterally      Reflexes:  Biceps: right 1+/4; left 1+/4  Brachioradialis: right 1+/4; left 1+/4  Triceps: right 1+/4; left 1+/4  Knee: right 1+/4; left 1+/4  Ankle: right 1+/4; left 1+/4    Ruizs sign: right negative; left negative  Babinski: right downgoing; left downgoing  Clonus: right negative; left negative    Coordination:  Finger to nose: right normal; left normal    Musculoskeletal:    Gait:  Toe walk- normal  Heel walk- normal  Tandem gait- normal    Straight leg test: right negative; left negative    Cervical: No pain with palpation posteriorly, mild pain with palpation in R trapezius  Upper back: No pain with palpation  Lower back: No pain with palpation      DIAGNOSTICS REVIEW OF IMAGING, LAB & OTHER STUDIES:  I have personally reviewed and evaluated the following reports as well as radiographic studies:    Cervical spine x-rays, 06/12/2023- there is normal alignment with degenerative disc disease at C3-4 and C4-5.    MRI cervical spine without gadolinium, 06/15/2023- there is mild kyphosis with otherwise normal alignment.  There are disc osteophyte complexes at C3-4 and C4-5 with mild-to-moderate bilateral neuroforaminal narrowing.  There is no significant central stenosis.      EMG/NCV bilateral upper extremities, 06/14/2023, Dr. Addison- bilateral moderate carpal tunnel syndrome, chronic left C7 radiculopathy.       ASSESSMENT:  Ms. Gudino is a 58 y.o. female who has a past medical history significant for hypertension and migraine headache.  She presents as a new patient in the neurosurgery clinic for mild, chronic intermittent right neck pain and axial low back pain.  The patient has also been experiencing chronic left-greater-than-right hand numbness for several months.  Ms. Gduino has a normal motor exam with decreased sensation to light touch in her bilateral forearms into all of her fingers.  There are no signs of  myelopathy.    I reviewed pertinent imaging studies with the patient.  A MRI cervical spine without gadolinium demonstrates mild kyphosis with otherwise normal alignment.  There are disc osteophyte complexes at C3-4 and C4-5 with mild-to-moderate bilateral neuroforaminal narrowing.  There is no significant central stenosis.  An EMG/NCV of the bilateral upper extremities demonstrate bilateral moderate carpal tunnel syndrome, chronic left C7 radiculopathy.         PLAN:    Encounter Diagnoses   Name Primary?    Cervical spondylosis Yes    Bilateral hand numbness     Bilateral carpal tunnel syndrome     Neck pain on right side     Chronic bilateral low back pain without sciatica      Orders Placed This Encounter   Procedures    Ambulatory referral/consult to Physical/Occupational Therapy        1.  I discussed with Ms. Gudino that continued optimization of medical management is recommended for her mild right neck and low back pain.  These symptoms are attributed to arthritis and musculoskeletal spasms.  The patient is very agreeable to nonsurgical management and would like to pursue elective spine surgery only as a last resort treatment option, which I absolutely agree.    2.  The patient's bilateral hand numbness is primarily attributed to carpal tunnel syndrome, especially because her left hand numbness improved after a steroid injection in her left wrist.    3.  Neurontin is being prescribed for her numbness symptoms.      5.  Ms. Gudino is being referred to physical therapy for her right neck and low back pain.    6.  Her BMI is elevated at 38.85.  We discussed weight loss goals with diet and exercise.  Maintaining a healthy weight and strengthening core muscles are important factors for reducing pain along the spine.     7.  The patient is recommended to follow up with orthopedic hand specialist Dr. Arana for her bilateral carpal tunnel syndrome.  She understands that I also treat carpal tunnel syndrome if another  evaluation is needed in the future.    8.  The patient is encouraged to follow up in the neurosurgery clinic on an as-needed basis for any concerning changes in condition or symptoms.          This note will be sent to the patient's referring provider Dr. Don Arana and primary care provider Sera Allen MD.           Lyn Alas MD  Neurosurgeon

## 2023-10-26 ENCOUNTER — OFFICE VISIT (OUTPATIENT)
Dept: NEUROSURGERY | Facility: CLINIC | Age: 58
End: 2023-10-26
Payer: COMMERCIAL

## 2023-10-26 VITALS
RESPIRATION RATE: 16 BRPM | HEART RATE: 64 BPM | DIASTOLIC BLOOD PRESSURE: 79 MMHG | BODY MASS INDEX: 39.08 KG/M2 | SYSTOLIC BLOOD PRESSURE: 120 MMHG | WEIGHT: 212.38 LBS | HEIGHT: 62 IN

## 2023-10-26 DIAGNOSIS — R20.0 BILATERAL HAND NUMBNESS: ICD-10-CM

## 2023-10-26 DIAGNOSIS — G56.03 BILATERAL CARPAL TUNNEL SYNDROME: ICD-10-CM

## 2023-10-26 DIAGNOSIS — G89.29 CHRONIC BILATERAL LOW BACK PAIN WITHOUT SCIATICA: ICD-10-CM

## 2023-10-26 DIAGNOSIS — M54.2 NECK PAIN ON RIGHT SIDE: ICD-10-CM

## 2023-10-26 DIAGNOSIS — M54.50 CHRONIC BILATERAL LOW BACK PAIN WITHOUT SCIATICA: ICD-10-CM

## 2023-10-26 DIAGNOSIS — M47.812 CERVICAL SPONDYLOSIS: Primary | ICD-10-CM

## 2023-10-26 PROCEDURE — 3044F HG A1C LEVEL LT 7.0%: CPT | Mod: CPTII,,, | Performed by: NEUROLOGICAL SURGERY

## 2023-10-26 PROCEDURE — 3044F PR MOST RECENT HEMOGLOBIN A1C LEVEL <7.0%: ICD-10-PCS | Mod: CPTII,,, | Performed by: NEUROLOGICAL SURGERY

## 2023-10-26 PROCEDURE — 99204 PR OFFICE/OUTPT VISIT, NEW, LEVL IV, 45-59 MIN: ICD-10-PCS | Mod: ,,, | Performed by: NEUROLOGICAL SURGERY

## 2023-10-26 PROCEDURE — 1160F PR REVIEW ALL MEDS BY PRESCRIBER/CLIN PHARMACIST DOCUMENTED: ICD-10-PCS | Mod: CPTII,,, | Performed by: NEUROLOGICAL SURGERY

## 2023-10-26 PROCEDURE — 3074F PR MOST RECENT SYSTOLIC BLOOD PRESSURE < 130 MM HG: ICD-10-PCS | Mod: CPTII,,, | Performed by: NEUROLOGICAL SURGERY

## 2023-10-26 PROCEDURE — 99204 OFFICE O/P NEW MOD 45 MIN: CPT | Mod: ,,, | Performed by: NEUROLOGICAL SURGERY

## 2023-10-26 PROCEDURE — 3078F DIAST BP <80 MM HG: CPT | Mod: CPTII,,, | Performed by: NEUROLOGICAL SURGERY

## 2023-10-26 PROCEDURE — 1159F PR MEDICATION LIST DOCUMENTED IN MEDICAL RECORD: ICD-10-PCS | Mod: CPTII,,, | Performed by: NEUROLOGICAL SURGERY

## 2023-10-26 PROCEDURE — 3074F SYST BP LT 130 MM HG: CPT | Mod: CPTII,,, | Performed by: NEUROLOGICAL SURGERY

## 2023-10-26 PROCEDURE — 1160F RVW MEDS BY RX/DR IN RCRD: CPT | Mod: CPTII,,, | Performed by: NEUROLOGICAL SURGERY

## 2023-10-26 PROCEDURE — 3008F BODY MASS INDEX DOCD: CPT | Mod: CPTII,,, | Performed by: NEUROLOGICAL SURGERY

## 2023-10-26 PROCEDURE — 1159F MED LIST DOCD IN RCRD: CPT | Mod: CPTII,,, | Performed by: NEUROLOGICAL SURGERY

## 2023-10-26 PROCEDURE — 3078F PR MOST RECENT DIASTOLIC BLOOD PRESSURE < 80 MM HG: ICD-10-PCS | Mod: CPTII,,, | Performed by: NEUROLOGICAL SURGERY

## 2023-10-26 PROCEDURE — 3008F PR BODY MASS INDEX (BMI) DOCUMENTED: ICD-10-PCS | Mod: CPTII,,, | Performed by: NEUROLOGICAL SURGERY

## 2023-10-26 RX ORDER — AZITHROMYCIN 250 MG/1
250 TABLET, FILM COATED ORAL
COMMUNITY
Start: 2023-08-23 | End: 2023-10-26

## 2023-10-26 RX ORDER — CLINDAMYCIN HYDROCHLORIDE 300 MG/1
300 CAPSULE ORAL 3 TIMES DAILY
COMMUNITY
Start: 2023-08-15 | End: 2023-10-26

## 2023-10-26 RX ORDER — HYDROCODONE BITARTRATE AND ACETAMINOPHEN 10; 325 MG/1; MG/1
1 TABLET ORAL EVERY 6 HOURS PRN
COMMUNITY
Start: 2023-08-15 | End: 2023-10-26

## 2023-10-26 RX ORDER — GABAPENTIN 300 MG/1
300 CAPSULE ORAL 3 TIMES DAILY
Qty: 90 CAPSULE | Refills: 1 | Status: SHIPPED | OUTPATIENT
Start: 2023-10-26 | End: 2024-01-18

## 2023-10-26 RX ORDER — METRONIDAZOLE 500 MG/1
500 TABLET ORAL 3 TIMES DAILY
COMMUNITY
Start: 2023-08-23 | End: 2023-10-26

## 2023-10-26 RX ORDER — CLINDAMYCIN HYDROCHLORIDE 150 MG/1
150 CAPSULE ORAL 3 TIMES DAILY
COMMUNITY
Start: 2023-08-09 | End: 2023-10-26

## 2023-10-26 NOTE — PATIENT INSTRUCTIONS
Ms. Gudino is a 58 y.o. female who has a past medical history significant for hypertension and migraine headache.  She presents as a new patient in the neurosurgery clinic for mild, chronic intermittent right neck pain and axial low back pain.  The patient has also been experiencing chronic left-greater-than-right hand numbness for several months.  Ms. Gudino has a normal motor exam with decreased sensation to light touch in her bilateral forearms into all of her fingers.  There are no signs of myelopathy.    I reviewed pertinent imaging studies with the patient.  A MRI cervical spine without gadolinium demonstrates mild kyphosis with otherwise normal alignment.  There are disc osteophyte complexes at C3-4 and C4-5 with mild-to-moderate bilateral neuroforaminal narrowing.  There is no significant central stenosis.  An EMG/NCV of the bilateral upper extremities demonstrate bilateral moderate carpal tunnel syndrome, chronic left C7 radiculopathy.         PLAN:    Encounter Diagnoses   Name Primary?    Cervical spondylosis Yes    Bilateral hand numbness     Bilateral carpal tunnel syndrome     Neck pain on right side     Chronic bilateral low back pain without sciatica      Orders Placed This Encounter   Procedures    Ambulatory referral/consult to Physical/Occupational Therapy        1.  I discussed with Ms. Gudino that continued optimization of medical management is recommended for her mild right neck and low back pain.  These symptoms are attributed to arthritis and musculoskeletal spasms.  The patient is very agreeable to nonsurgical management and would like to pursue elective spine surgery only as a last resort treatment option, which I absolutely agree.    2.  The patient's bilateral hand numbness is primarily attributed to carpal tunnel syndrome, especially because her left hand numbness improved after a steroid injection in her left wrist.    3.  Neurontin is being prescribed for her numbness symptoms.      5.   Ms. Gudino is being referred to physical therapy for her right neck and low back pain.    6.  Her BMI is elevated at 38.85.  We discussed weight loss goals with diet and exercise.  Maintaining a healthy weight and strengthening core muscles are important factors for reducing pain along the spine.     7.  The patient is recommended to follow up with orthopedic hand specialist Dr. Arana for her bilateral carpal tunnel syndrome.  She understands that I also treat carpal tunnel syndrome if another evaluation is needed in the future.    8.  The patient is encouraged to follow up in the neurosurgery clinic on an as-needed basis for any concerning changes in condition or symptoms.          This note will be sent to the patient's referring provider Dr. Don Arana and primary care provider Sera Allen MD.

## 2023-11-01 ENCOUNTER — TELEPHONE (OUTPATIENT)
Dept: NEUROSURGERY | Facility: CLINIC | Age: 58
End: 2023-11-01
Payer: COMMERCIAL

## 2023-11-01 NOTE — TELEPHONE ENCOUNTER
I called the patient to see if she has heard from [facility] regarding her PT. She stated they actually called her today and did schedule her initial evaluation.

## 2023-11-17 ENCOUNTER — LAB VISIT (OUTPATIENT)
Dept: LAB | Facility: HOSPITAL | Age: 58
End: 2023-11-17
Attending: FAMILY MEDICINE
Payer: COMMERCIAL

## 2023-11-17 DIAGNOSIS — R73.03 PREDIABETES: ICD-10-CM

## 2023-11-17 DIAGNOSIS — E04.1 THYROID NODULE: ICD-10-CM

## 2023-11-17 DIAGNOSIS — E78.2 MIXED HYPERLIPIDEMIA: ICD-10-CM

## 2023-11-17 DIAGNOSIS — I10 ESSENTIAL HYPERTENSION: ICD-10-CM

## 2023-11-17 LAB
ALBUMIN SERPL-MCNC: 3.7 G/DL (ref 3.5–5)
ALBUMIN/GLOB SERPL: 0.9 RATIO (ref 1.1–2)
ALP SERPL-CCNC: 71 UNIT/L (ref 40–150)
ALT SERPL-CCNC: 12 UNIT/L (ref 0–55)
AST SERPL-CCNC: 16 UNIT/L (ref 5–34)
BASOPHILS # BLD AUTO: 0.04 X10(3)/MCL
BASOPHILS NFR BLD AUTO: 0.8 %
BILIRUB SERPL-MCNC: 0.3 MG/DL
BUN SERPL-MCNC: 8.2 MG/DL (ref 9.8–20.1)
CALCIUM SERPL-MCNC: 9.6 MG/DL (ref 8.4–10.2)
CHLORIDE SERPL-SCNC: 109 MMOL/L (ref 98–107)
CHOLEST SERPL-MCNC: 265 MG/DL
CHOLEST/HDLC SERPL: 7 {RATIO} (ref 0–5)
CO2 SERPL-SCNC: 26 MMOL/L (ref 22–29)
CREAT SERPL-MCNC: 0.74 MG/DL (ref 0.55–1.02)
EOSINOPHIL # BLD AUTO: 0.24 X10(3)/MCL (ref 0–0.9)
EOSINOPHIL NFR BLD AUTO: 4.5 %
ERYTHROCYTE [DISTWIDTH] IN BLOOD BY AUTOMATED COUNT: 12.9 % (ref 11.5–17)
EST. AVERAGE GLUCOSE BLD GHB EST-MCNC: 108.3 MG/DL
GFR SERPLBLD CREATININE-BSD FMLA CKD-EPI: >60 MLS/MIN/1.73/M2
GLOBULIN SER-MCNC: 4 GM/DL (ref 2.4–3.5)
GLUCOSE SERPL-MCNC: 88 MG/DL (ref 74–100)
HBA1C MFR BLD: 5.4 %
HCT VFR BLD AUTO: 40.9 % (ref 37–47)
HDLC SERPL-MCNC: 36 MG/DL (ref 35–60)
HGB BLD-MCNC: 13.5 G/DL (ref 12–16)
IMM GRANULOCYTES # BLD AUTO: 0 X10(3)/MCL (ref 0–0.04)
IMM GRANULOCYTES NFR BLD AUTO: 0 %
LDLC SERPL CALC-MCNC: 173 MG/DL (ref 50–140)
LYMPHOCYTES # BLD AUTO: 2.3 X10(3)/MCL (ref 0.6–4.6)
LYMPHOCYTES NFR BLD AUTO: 43.6 %
MCH RBC QN AUTO: 27.2 PG (ref 27–31)
MCHC RBC AUTO-ENTMCNC: 33 G/DL (ref 33–36)
MCV RBC AUTO: 82.5 FL (ref 80–94)
MONOCYTES # BLD AUTO: 0.35 X10(3)/MCL (ref 0.1–1.3)
MONOCYTES NFR BLD AUTO: 6.6 %
NEUTROPHILS # BLD AUTO: 2.35 X10(3)/MCL (ref 2.1–9.2)
NEUTROPHILS NFR BLD AUTO: 44.5 %
NRBC BLD AUTO-RTO: 0 %
PLATELET # BLD AUTO: 264 X10(3)/MCL (ref 130–400)
PMV BLD AUTO: 10.9 FL (ref 7.4–10.4)
POTASSIUM SERPL-SCNC: 4.2 MMOL/L (ref 3.5–5.1)
PROT SERPL-MCNC: 7.7 GM/DL (ref 6.4–8.3)
RBC # BLD AUTO: 4.96 X10(6)/MCL (ref 4.2–5.4)
SODIUM SERPL-SCNC: 141 MMOL/L (ref 136–145)
TRIGL SERPL-MCNC: 278 MG/DL (ref 37–140)
TSH SERPL-ACNC: 0.31 UIU/ML (ref 0.35–4.94)
VLDLC SERPL CALC-MCNC: 56 MG/DL
WBC # SPEC AUTO: 5.28 X10(3)/MCL (ref 4.5–11.5)

## 2023-11-17 PROCEDURE — 36415 COLL VENOUS BLD VENIPUNCTURE: CPT

## 2023-11-17 PROCEDURE — 83036 HEMOGLOBIN GLYCOSYLATED A1C: CPT

## 2023-11-17 PROCEDURE — 80053 COMPREHEN METABOLIC PANEL: CPT

## 2023-11-17 PROCEDURE — 84443 ASSAY THYROID STIM HORMONE: CPT

## 2023-11-17 PROCEDURE — 80061 LIPID PANEL: CPT

## 2023-11-17 PROCEDURE — 85025 COMPLETE CBC W/AUTO DIFF WBC: CPT

## 2023-11-27 ENCOUNTER — OFFICE VISIT (OUTPATIENT)
Dept: FAMILY MEDICINE | Facility: CLINIC | Age: 58
End: 2023-11-27
Payer: COMMERCIAL

## 2023-11-27 VITALS
SYSTOLIC BLOOD PRESSURE: 116 MMHG | HEART RATE: 62 BPM | TEMPERATURE: 98 F | OXYGEN SATURATION: 99 % | WEIGHT: 212.19 LBS | DIASTOLIC BLOOD PRESSURE: 76 MMHG | BODY MASS INDEX: 39.05 KG/M2 | HEIGHT: 62 IN

## 2023-11-27 DIAGNOSIS — Z12.31 BREAST CANCER SCREENING BY MAMMOGRAM: ICD-10-CM

## 2023-11-27 DIAGNOSIS — J30.2 SEASONAL ALLERGIES: ICD-10-CM

## 2023-11-27 DIAGNOSIS — E04.1 THYROID NODULE: ICD-10-CM

## 2023-11-27 DIAGNOSIS — I10 ESSENTIAL HYPERTENSION: Primary | ICD-10-CM

## 2023-11-27 DIAGNOSIS — E78.2 MIXED HYPERLIPIDEMIA: ICD-10-CM

## 2023-11-27 DIAGNOSIS — Z00.00 WELLNESS EXAMINATION: ICD-10-CM

## 2023-11-27 DIAGNOSIS — R73.03 PREDIABETES: ICD-10-CM

## 2023-11-27 DIAGNOSIS — K21.9 GASTRIC REFLUX: ICD-10-CM

## 2023-11-27 PROCEDURE — 3008F BODY MASS INDEX DOCD: CPT | Mod: CPTII,,, | Performed by: FAMILY MEDICINE

## 2023-11-27 PROCEDURE — 3008F PR BODY MASS INDEX (BMI) DOCUMENTED: ICD-10-PCS | Mod: CPTII,,, | Performed by: FAMILY MEDICINE

## 2023-11-27 PROCEDURE — 1159F MED LIST DOCD IN RCRD: CPT | Mod: CPTII,,, | Performed by: FAMILY MEDICINE

## 2023-11-27 PROCEDURE — 1159F PR MEDICATION LIST DOCUMENTED IN MEDICAL RECORD: ICD-10-PCS | Mod: CPTII,,, | Performed by: FAMILY MEDICINE

## 2023-11-27 PROCEDURE — 3074F PR MOST RECENT SYSTOLIC BLOOD PRESSURE < 130 MM HG: ICD-10-PCS | Mod: CPTII,,, | Performed by: FAMILY MEDICINE

## 2023-11-27 PROCEDURE — 3074F SYST BP LT 130 MM HG: CPT | Mod: CPTII,,, | Performed by: FAMILY MEDICINE

## 2023-11-27 PROCEDURE — 99214 PR OFFICE/OUTPT VISIT, EST, LEVL IV, 30-39 MIN: ICD-10-PCS | Mod: ,,, | Performed by: FAMILY MEDICINE

## 2023-11-27 PROCEDURE — 1160F RVW MEDS BY RX/DR IN RCRD: CPT | Mod: CPTII,,, | Performed by: FAMILY MEDICINE

## 2023-11-27 PROCEDURE — 3078F DIAST BP <80 MM HG: CPT | Mod: CPTII,,, | Performed by: FAMILY MEDICINE

## 2023-11-27 PROCEDURE — 3044F HG A1C LEVEL LT 7.0%: CPT | Mod: CPTII,,, | Performed by: FAMILY MEDICINE

## 2023-11-27 PROCEDURE — 1160F PR REVIEW ALL MEDS BY PRESCRIBER/CLIN PHARMACIST DOCUMENTED: ICD-10-PCS | Mod: CPTII,,, | Performed by: FAMILY MEDICINE

## 2023-11-27 PROCEDURE — 3044F PR MOST RECENT HEMOGLOBIN A1C LEVEL <7.0%: ICD-10-PCS | Mod: CPTII,,, | Performed by: FAMILY MEDICINE

## 2023-11-27 PROCEDURE — 3078F PR MOST RECENT DIASTOLIC BLOOD PRESSURE < 80 MM HG: ICD-10-PCS | Mod: CPTII,,, | Performed by: FAMILY MEDICINE

## 2023-11-27 PROCEDURE — 99214 OFFICE O/P EST MOD 30 MIN: CPT | Mod: ,,, | Performed by: FAMILY MEDICINE

## 2023-11-27 RX ORDER — ALBUTEROL SULFATE 90 UG/1
AEROSOL, METERED RESPIRATORY (INHALATION)
Qty: 18 G | Refills: 3 | Status: SHIPPED | OUTPATIENT
Start: 2023-11-27 | End: 2023-11-27 | Stop reason: SDUPTHER

## 2023-11-27 RX ORDER — FLUTICASONE PROPIONATE 50 MCG
SPRAY, SUSPENSION (ML) NASAL
Qty: 16 G | Refills: 3 | Status: SHIPPED | OUTPATIENT
Start: 2023-11-27

## 2023-11-27 NOTE — PROGRESS NOTES
Patient ID: [id number]     Chief Complaint: Results      HPI:     Rae Gudino is a 58 y.o. female here today for follow-up/discuss test results  Was evaluated by hand surgeon/neurosurgeon-some improvement with use of gabapentin-patient is not interested in any surgical options this point.  1) Hypertension: Patient has been taking medicine daily. No symptoms associated with increased BP such as headache/ visual changes/ dizziness/ chest pain.    2) Hyperlipidemia: Patient stopped taking medication after pharmacist called her-asking about her medications-she complained of joint pain muscle ache-was told that it could possibly be a side effect of Lipitor-patient has stopped medication-no changes in joint pain/muscle ache-forgot to restart the medication.  3)  Reflux: Patient is continuing to take medication-no symptoms associated with reflux.  No noticeable side effects of medication.   4)  Seasonal Allergies: Patient is continuing to take medication as needed-symptoms are controlled.  No noticeable side effects of medication.  Does  need prescriptions to be refilled.  5) Prediabetic: Patient is taking metformin in order to help prevent diabetes-no side effects of medicine noticeable     Past Medical History:   Diagnosis Date    Essential hypertension 2022    Gastric reflux 2023    Migraine with aura and without status migrainosus, not intractable 2023    Mixed hyperlipidemia 2022    Prediabetes 2022    Seasonal allergies 2022        Past Surgical History:   Procedure Laterality Date    APPENDECTOMY  over 30 years    was in for another surgery and dr decided to remove it     SECTION      gallstone removal      HYSTERECTOMY      partial    PARTIAL HYSTERECTOMY      REPAIR OF MENISCUS OF KNEE Left     TUBAL LIGATION          Social History     Tobacco Use    Smoking status: Never    Smokeless tobacco: Never   Substance Use Topics    Alcohol use: Yes     Alcohol/week:  "1.0 standard drink of alcohol     Types: 1 Glasses of wine per week     Comment: likely once/twice every few months    Drug use: Never        Social History     Socioeconomic History    Marital status:      Spouse name: Crispin    Number of children: 4        Current Outpatient Medications   Medication Instructions    albuterol (PROVENTIL/VENTOLIN HFA) 90 mcg/actuation inhaler 2 puff    amLODIPine (NORVASC) 5 MG tablet 1 tablet, Oral, Daily    fluticasone propionate (FLONASE) 50 mcg/actuation nasal spray 1 spray in each nostril    gabapentin (NEURONTIN) 300 mg, Oral, 3 times daily    metFORMIN (GLUCOPHAGE) 500 mg, Oral    montelukast (SINGULAIR) 10 mg tablet 1 tablet    omeprazole (PRILOSEC) 40 mg, Oral, Daily    rizatriptan (MAXALT-MLT) 10 MG disintegrating tablet DISSOLVE 1 TABLET ON THE TONGUE AND ALLOW TO DISSOLVE AS NEEDED ONE TIME DAILY       Review of patient's allergies indicates:   Allergen Reactions    Penicillins Rash        Patient Care Team:  Sera Allen MD as PCP - General (Family Medicine)  Sb Sanford MD as Consulting Physician (Orthopedic Surgery)  Lyn Alas MD (Neurosurgery)  Darius Winter Jr., MD as Consulting Physician (Otolaryngology)  Don Arana MD as Resident (Hand Surgery)       Subjective:     Review of Systems    12 point review of systems conducted, negative except as stated in the history of present illness. See HPI for details.      Objective:     Visit Vitals  /76   Pulse 62   Temp 98.2 °F (36.8 °C)   Ht 5' 2" (1.575 m)   Wt 96.3 kg (212 lb 3.2 oz)   SpO2 99%   BMI 38.81 kg/m²       Physical Exam    General: Alert and oriented, No acute distress.  Head: Normocephalic, Atraumatic.  Eye: Pupils are equal, round and reactive to light, Extraocular movements are intact, Sclera non-icteric.  Ears/Nose/Throat: Normal, Mucosa moist,Clear.  Neck/Thyroid: Supple  Respiratory: Clear to auscultation bilaterally  Cardiovascular: Regular rate and " rhythm  Gastrointestinal: Soft, Non-tender, Non-distended, Normal bowel sounds  Musculoskeletal: Normal range of motion.  Integumentary: Warm, Dry, Intact  Extremities: No clubbing, cyanosis or edema  Neurologic: No focal deficits, Cranial Nerves II-XII are grossly intact  Psychiatric: Normal interaction, Coherent speech, Euthymic mood, Appropriate affect       Assessment:       ICD-10-CM ICD-9-CM   1. Essential hypertension  I10 401.9   2. Mixed hyperlipidemia  E78.2 272.2   3. Prediabetes  R73.03 790.29   4. Gastric reflux  K21.9 530.81   5. Thyroid nodule  E04.1 241.0   6. Seasonal allergies  J30.2 477.9   7. Breast cancer screening by mammogram  Z12.31 V76.12   8. Wellness examination  Z00.00 V70.0        Plan:     1. Essential hypertension  Patient has been taking medication-Norvasc 5 mg. Blood pressure goal of less than 130/80-blood pressure is stable. Continue to encourage diet and activity modifications. Including stress management. Pathophysiology/treatment/dangers discussed.    2. Mixed hyperlipidemia  Lab Results   Component Value Date    CHOL 265 (H) 11/17/2023    .00 (H) 11/17/2023    TRIG 278 (H) 11/17/2023    HDL 36 11/17/2023     Pathophysiology/treatment/dangers discussed. Patient to continue diet modification-restart Lipitor/Co Q10 200 mg-patient aware of risk associated with elevated cholesterol.   Total cholesterol 265 ( Goal less than 200) HDL 36 ( Goal high as possible)  ( Goal less than 100) Triglycerides 278 ( Goal less than 150)   The 10-year ASCVD risk score (Jhonathan RODRIGUES, et al., 2019) is: 7.1%    Values used to calculate the score:      Age: 58 years      Sex: Female      Is Non- : Yes      Diabetic: No      Tobacco smoker: No      Systolic Blood Pressure: 116 mmHg      Is BP treated: Yes      HDL Cholesterol: 36 mg/dL      Total Cholesterol: 265 mg/dL    3. Prediabetes  Hemoglobin A1c 5.4  Normal less than 5.7 - Diagnosis of Type 2 Diabetes Mellitus at  6.5. Encourage diet and activity modification-patient to continue metformin- Pathophysiology/treatment/dangers discussed.    4. Gastric reflux  Reflux: Pathophysiology/treatment/dangers discussed.Patient is taking medication for reflux-omeprazole 40 mg-made aware of risk associated with medication.  For this patient benefits outweigh the risk.    5. Thyroid nodule  Pathophysiology/Treatment/ Dangers Discussed.  Was evaluated by Dr. Winter-biopsy completed-abnormal TSH-recheck TSH and T4 in 3 months management based on findings.  - TSH; Future  - T4, Free; Future    6. Seasonal allergies  Patient is currently stable.  No acute modifications recommended.  Continue with current treatment.-nasal spray/albuterol inhaler sent to pharmacy.    - albuterol (PROVENTIL/VENTOLIN HFA) 90 mcg/actuation inhaler; 2 puff  Dispense: 18 g; Refill: 3  - fluticasone propionate (FLONASE) 50 mcg/actuation nasal spray; 1 spray in each nostril  Dispense: 16 g; Refill: 3    7. Breast cancer screening by mammogram  Check studies management based upon results.  Pathophysiology/treatment/dangers discussed.   - Mammo Digital Screening Bilat; Future    8. Wellness examination  Patient given lab orders to be completed before wellness visit.   - CBC Auto Differential; Future  - Comprehensive Metabolic Panel; Future  - Lipid Panel; Future  - TSH; Future  - Hemoglobin A1C; Future  - Urinalysis; Future  - Urinalysis  - Ambulatory referral/consult to Obstetrics / Gynecology; Future  - Hepatitis C Antibody; Future  - HIV 1/2 Ag/Ab (4th Gen); Future         Follow up in about 6 months (around 5/27/2024) for Annual Wellness. In addition to their scheduled follow up, the patient has also been instructed to follow up on as needed basis.     Future Appointments   Date Time Provider Department Center   5/28/2024  9:30 [facility]   6/25/2024 10:00 AM Darius Winter Jr., MD [facility]        Sera Allen MD

## 2023-11-27 NOTE — TELEPHONE ENCOUNTER
----- Message from Kathi Krause sent at 11/27/2023  3:10 PM CST -----  Regarding: refill  [facility] requesting a refill on albuterol inh 8.5 mg to be sent to [phone]

## 2023-11-28 RX ORDER — ALBUTEROL SULFATE 90 UG/1
AEROSOL, METERED RESPIRATORY (INHALATION)
Qty: 18 G | Refills: 3 | Status: SHIPPED | OUTPATIENT
Start: 2023-11-28

## 2023-11-29 DIAGNOSIS — I10 ESSENTIAL HYPERTENSION: Primary | ICD-10-CM

## 2023-11-29 RX ORDER — AMLODIPINE BESYLATE 5 MG/1
5 TABLET ORAL DAILY
Qty: 90 TABLET | Refills: 1 | Status: SHIPPED | OUTPATIENT
Start: 2023-11-29

## 2023-11-29 NOTE — TELEPHONE ENCOUNTER
Patient called requesting a refill on her Amlodipine 5mg to be sent to [facility] for a 90 day supply

## 2023-12-06 ENCOUNTER — TELEPHONE (OUTPATIENT)
Dept: FAMILY MEDICINE | Facility: CLINIC | Age: 58
End: 2023-12-06

## 2023-12-06 ENCOUNTER — OFFICE VISIT (OUTPATIENT)
Dept: FAMILY MEDICINE | Facility: CLINIC | Age: 58
End: 2023-12-06
Payer: COMMERCIAL

## 2023-12-06 VITALS
TEMPERATURE: 98 F | HEART RATE: 71 BPM | OXYGEN SATURATION: 98 % | WEIGHT: 213 LBS | SYSTOLIC BLOOD PRESSURE: 137 MMHG | HEIGHT: 62 IN | BODY MASS INDEX: 39.2 KG/M2 | DIASTOLIC BLOOD PRESSURE: 84 MMHG

## 2023-12-06 DIAGNOSIS — T30.0 BURN: Primary | ICD-10-CM

## 2023-12-06 PROCEDURE — 3044F HG A1C LEVEL LT 7.0%: CPT | Mod: CPTII,,, | Performed by: FAMILY MEDICINE

## 2023-12-06 PROCEDURE — 3079F DIAST BP 80-89 MM HG: CPT | Mod: CPTII,,, | Performed by: FAMILY MEDICINE

## 2023-12-06 PROCEDURE — 3008F BODY MASS INDEX DOCD: CPT | Mod: CPTII,,, | Performed by: FAMILY MEDICINE

## 2023-12-06 PROCEDURE — 1159F PR MEDICATION LIST DOCUMENTED IN MEDICAL RECORD: ICD-10-PCS | Mod: CPTII,,, | Performed by: FAMILY MEDICINE

## 2023-12-06 PROCEDURE — 3044F PR MOST RECENT HEMOGLOBIN A1C LEVEL <7.0%: ICD-10-PCS | Mod: CPTII,,, | Performed by: FAMILY MEDICINE

## 2023-12-06 PROCEDURE — 3079F PR MOST RECENT DIASTOLIC BLOOD PRESSURE 80-89 MM HG: ICD-10-PCS | Mod: CPTII,,, | Performed by: FAMILY MEDICINE

## 2023-12-06 PROCEDURE — 3075F SYST BP GE 130 - 139MM HG: CPT | Mod: CPTII,,, | Performed by: FAMILY MEDICINE

## 2023-12-06 PROCEDURE — 99213 OFFICE O/P EST LOW 20 MIN: CPT | Mod: ,,, | Performed by: FAMILY MEDICINE

## 2023-12-06 PROCEDURE — 3008F PR BODY MASS INDEX (BMI) DOCUMENTED: ICD-10-PCS | Mod: CPTII,,, | Performed by: FAMILY MEDICINE

## 2023-12-06 PROCEDURE — 1160F PR REVIEW ALL MEDS BY PRESCRIBER/CLIN PHARMACIST DOCUMENTED: ICD-10-PCS | Mod: CPTII,,, | Performed by: FAMILY MEDICINE

## 2023-12-06 PROCEDURE — 99213 PR OFFICE/OUTPT VISIT, EST, LEVL III, 20-29 MIN: ICD-10-PCS | Mod: ,,, | Performed by: FAMILY MEDICINE

## 2023-12-06 PROCEDURE — 1160F RVW MEDS BY RX/DR IN RCRD: CPT | Mod: CPTII,,, | Performed by: FAMILY MEDICINE

## 2023-12-06 PROCEDURE — 1159F MED LIST DOCD IN RCRD: CPT | Mod: CPTII,,, | Performed by: FAMILY MEDICINE

## 2023-12-06 PROCEDURE — 3075F PR MOST RECENT SYSTOLIC BLOOD PRESS GE 130-139MM HG: ICD-10-PCS | Mod: CPTII,,, | Performed by: FAMILY MEDICINE

## 2023-12-06 RX ORDER — MUPIROCIN 20 MG/G
OINTMENT TOPICAL 3 TIMES DAILY
Qty: 15 G | Refills: 0 | Status: SHIPPED | OUTPATIENT
Start: 2023-12-06 | End: 2023-12-13

## 2023-12-06 NOTE — TELEPHONE ENCOUNTER
Patient called stating that she burned herself very badly, and it's on her neck. Appointment scheduled for assessment

## 2023-12-06 NOTE — PROGRESS NOTES
Patient ID: [id number]     Chief Complaint: Burn      HPI:     Rae Gudino is a 58 y.o. female here today for acute visit  1) Patient was cooking on -potatoes were splattering-splattered onto her neck causing a burn-over time the pain has decreased-area of irritation around the burn-patient was concerned about infection.  Has been using Band-Aid to cover the area and topical antibiotic.  No associated systemic symptoms such as fever, chills, nausea, vomiting or shortness of breath.      Past Medical History:   Diagnosis Date    Essential hypertension 2022    Gastric reflux 2023    Migraine with aura and without status migrainosus, not intractable 2023    Mixed hyperlipidemia 2022    Prediabetes 2022    Seasonal allergies 2022        Past Surgical History:   Procedure Laterality Date    APPENDECTOMY  over 30 years    was in for another surgery and dr decided to remove it     SECTION      gallstone removal      HYSTERECTOMY      partial    PARTIAL HYSTERECTOMY      REPAIR OF MENISCUS OF KNEE Left     TUBAL LIGATION          Social History     Tobacco Use    Smoking status: Never    Smokeless tobacco: Never   Substance Use Topics    Alcohol use: Yes     Alcohol/week: 1.0 standard drink of alcohol     Types: 1 Glasses of wine per week     Comment: likely once/twice every few months    Drug use: Never        Social History     Socioeconomic History    Marital status:      Spouse name: Crispin    Number of children: 4        Current Outpatient Medications   Medication Instructions    albuterol (PROVENTIL/VENTOLIN HFA) 90 mcg/actuation inhaler 2 puff    amLODIPine (NORVASC) 5 mg, Oral, Daily    fluticasone propionate (FLONASE) 50 mcg/actuation nasal spray 1 spray in each nostril    gabapentin (NEURONTIN) 300 mg, Oral, 3 times daily    metFORMIN (GLUCOPHAGE) 500 mg, Oral    montelukast (SINGULAIR) 10 mg tablet 1 tablet    mupirocin (BACTROBAN) 2 %  "ointment Topical (Top), 3 times daily    omeprazole (PRILOSEC) 40 mg, Oral, Daily    rizatriptan (MAXALT-MLT) 10 MG disintegrating tablet DISSOLVE 1 TABLET ON THE TONGUE AND ALLOW TO DISSOLVE AS NEEDED ONE TIME DAILY       Review of patient's allergies indicates:   Allergen Reactions    Penicillins Rash        Patient Care Team:  Sera Allen MD as PCP - General (Family Medicine)  Sb Sanford MD as Consulting Physician (Orthopedic Surgery)  Lyn Alas MD (Neurosurgery)  Darius Winter Jr., MD as Consulting Physician (Otolaryngology)  Don Arana MD as Resident (Hand Surgery)       Subjective:     Review of Systems    12 point review of systems conducted, negative except as stated in the history of present illness. See HPI for details.      Objective:     Visit Vitals  /84   Pulse 71   Temp 98 °F (36.7 °C)   Ht 5' 2" (1.575 m)   Wt 96.6 kg (213 lb)   SpO2 98%   BMI 38.96 kg/m²       Physical Exam    General: Alert and oriented, No acute distress.  Head: Normocephalic, Atraumatic.  Eye: Pupils are equal, round and reactive to light, Extraocular movements are intact, Sclera non-icteric.  Ears/Nose/Throat: Normal, Mucosa moist,Clear.  Neck/Thyroid: Supple  Respiratory: Clear to auscultation bilaterally  Cardiovascular: Regular rate and rhythm  Gastrointestinal: Soft, Non-tender, Non-distended, Normal bowel sounds  Musculoskeletal: Normal range of motion.  Integumentary: Warm, Dry, Intact, 1 cm area-second-degree-no acute signs of infection.  Extremities: No clubbing, cyanosis or edema  Neurologic: No focal deficits, Cranial Nerves II-XII are grossly intact  Psychiatric: Normal interaction    Assessment:       ICD-10-CM ICD-9-CM   1. Burn  T30.0 949.0        Plan:     1. Burn  Pathophysiology/Treatment/ Dangers Discussed.  Symptomatic treatment-Rx topical antibiotic patient to call office with acute changes or concerns.  - mupirocin (BACTROBAN) 2 % ointment; Apply topically 3 (three) times daily. " for 7 days  Dispense: 15 g; Refill: 0         Follow up if symptoms worsen or fail to improve. In addition to their scheduled follow up, the patient has also been instructed to follow up on as needed basis.     Future Appointments   Date Time Provider Department Center   12/15/2023  2:15 PM [facility] MAMMO1 SCR1 [facility] [place]    5/28/2024  9:30 AM Sera Allen MD [facility]   6/25/2024 10:00 AM Darius Winter Jr., MD [facility] [address] [facility]        Sera Allen MD

## 2023-12-15 ENCOUNTER — HOSPITAL ENCOUNTER (OUTPATIENT)
Dept: RADIOLOGY | Facility: HOSPITAL | Age: 58
Discharge: HOME | End: 2023-12-15
Attending: FAMILY MEDICINE
Payer: COMMERCIAL

## 2023-12-15 DIAGNOSIS — Z12.31 BREAST CANCER SCREENING BY MAMMOGRAM: ICD-10-CM

## 2023-12-15 PROCEDURE — 77067 SCR MAMMO BI INCL CAD: CPT | Mod: TC

## 2023-12-15 PROCEDURE — 77063 BREAST TOMOSYNTHESIS BI: CPT | Mod: 26,,, | Performed by: RADIOLOGY

## 2023-12-15 PROCEDURE — 77067 SCR MAMMO BI INCL CAD: CPT | Mod: 26,,, | Performed by: RADIOLOGY

## 2023-12-15 PROCEDURE — 77063 MAMMO DIGITAL SCREENING BILAT WITH TOMO: ICD-10-PCS | Mod: 26,,, | Performed by: RADIOLOGY

## 2023-12-15 PROCEDURE — 77067 MAMMO DIGITAL SCREENING BILAT WITH TOMO: ICD-10-PCS | Mod: 26,,, | Performed by: RADIOLOGY

## 2024-01-03 DIAGNOSIS — R11.0 NAUSEA: ICD-10-CM

## 2024-01-03 RX ORDER — OMEPRAZOLE 40 MG/1
40 CAPSULE, DELAYED RELEASE ORAL
Qty: 30 CAPSULE | Refills: 1 | Status: SHIPPED | OUTPATIENT
Start: 2024-01-03

## 2024-01-18 ENCOUNTER — OFFICE VISIT (OUTPATIENT)
Dept: FAMILY MEDICINE | Facility: CLINIC | Age: 59
End: 2024-01-18
Payer: COMMERCIAL

## 2024-01-18 VITALS
WEIGHT: 214 LBS | HEART RATE: 77 BPM | DIASTOLIC BLOOD PRESSURE: 71 MMHG | TEMPERATURE: 98 F | SYSTOLIC BLOOD PRESSURE: 123 MMHG | OXYGEN SATURATION: 98 % | BODY MASS INDEX: 39.38 KG/M2 | HEIGHT: 62 IN

## 2024-01-18 DIAGNOSIS — R51.9 FREQUENT HEADACHES: Primary | ICD-10-CM

## 2024-01-18 PROCEDURE — 3008F BODY MASS INDEX DOCD: CPT | Mod: CPTII,,, | Performed by: FAMILY MEDICINE

## 2024-01-18 PROCEDURE — 3078F DIAST BP <80 MM HG: CPT | Mod: CPTII,,, | Performed by: FAMILY MEDICINE

## 2024-01-18 PROCEDURE — 99214 OFFICE O/P EST MOD 30 MIN: CPT | Mod: ,,, | Performed by: FAMILY MEDICINE

## 2024-01-18 PROCEDURE — 1160F RVW MEDS BY RX/DR IN RCRD: CPT | Mod: CPTII,,, | Performed by: FAMILY MEDICINE

## 2024-01-18 PROCEDURE — 3074F SYST BP LT 130 MM HG: CPT | Mod: CPTII,,, | Performed by: FAMILY MEDICINE

## 2024-01-18 PROCEDURE — 1159F MED LIST DOCD IN RCRD: CPT | Mod: CPTII,,, | Performed by: FAMILY MEDICINE

## 2024-01-18 RX ORDER — ATORVASTATIN CALCIUM 20 MG/1
20 TABLET, FILM COATED ORAL
COMMUNITY
Start: 2023-12-26 | End: 2024-04-10

## 2024-01-19 NOTE — PROGRESS NOTES
Patient ID: [id number]     Chief Complaint: Headache      HPI:     Rae Gudino is a 58 y.o. female here today for acute visit  1) Headaches:  Patient has been experiencing headaches for the last 1 month-headaches are occurring throughout the day sometimes 2 to 3 times a day-severe headache in the front of her head-both sides-lasting for a few minutes and then resolving-intensity of pain is variable-no associated systemic symptoms such as visual changes, fever, chills, night sweats, chest pain or shortness of breath.  Patient rarely takes medication for the headache-is not similar to her migraine headaches.       Past Medical History:   Diagnosis Date    Essential hypertension 2022    Gastric reflux 2023    Migraine with aura and without status migrainosus, not intractable 2023    Mixed hyperlipidemia 2022    Prediabetes 2022    Seasonal allergies 2022        Past Surgical History:   Procedure Laterality Date    APPENDECTOMY  over 30 years    was in for another surgery and dr decided to remove it     SECTION      gallstone removal      HYSTERECTOMY      partial    PARTIAL HYSTERECTOMY      REPAIR OF MENISCUS OF KNEE Left     TUBAL LIGATION          Social History     Tobacco Use    Smoking status: Never    Smokeless tobacco: Never   Substance Use Topics    Alcohol use: Yes     Alcohol/week: 1.0 standard drink of alcohol     Types: 1 Glasses of wine per week     Comment: likely once/twice every few months    Drug use: Never        Social History     Socioeconomic History    Marital status:      Spouse name: Crispin    Number of children: 4        Current Outpatient Medications   Medication Instructions    albuterol (PROVENTIL/VENTOLIN HFA) 90 mcg/actuation inhaler 2 puff    amLODIPine (NORVASC) 5 mg, Oral, Daily    atorvastatin (LIPITOR) 20 mg, Oral    fluticasone propionate (FLONASE) 50 mcg/actuation nasal spray 1 spray in each nostril    gabapentin  "(NEURONTIN) 300 mg, Oral, 3 times daily    metFORMIN (GLUCOPHAGE) 500 mg, Oral    montelukast (SINGULAIR) 10 mg tablet 1 tablet    omeprazole (PRILOSEC) 40 mg, Oral    rizatriptan (MAXALT-MLT) 10 MG disintegrating tablet DISSOLVE 1 TABLET ON THE TONGUE AND ALLOW TO DISSOLVE AS NEEDED ONE TIME DAILY       Review of patient's allergies indicates:   Allergen Reactions    Penicillins Rash        Patient Care Team:  Sera Allen MD as PCP - General (Family Medicine)  Sb Sanford MD as Consulting Physician (Orthopedic Surgery)  Lyn Alas MD (Neurosurgery)  Darius Winter Jr., MD as Consulting Physician (Otolaryngology)  Don Arana MD as Resident (Hand Surgery)       Subjective:     Review of Systems    12 point review of systems conducted, negative except as stated in the history of present illness. See HPI for details.      Objective:     Visit Vitals  /71   Pulse 77   Temp 98 °F (36.7 °C)   Ht 5' 2" (1.575 m)   Wt 97.1 kg (214 lb)   SpO2 98%   BMI 39.14 kg/m²       Physical Exam    General: Alert and oriented, No acute distress.  Head: Normocephalic, Atraumatic.  Eye: Pupils are equal, round and reactive to light, Extraocular movements are intact, Sclera non-icteric.  Ears/Nose/Throat: Normal, Mucosa moist,Clear.  Neck/Thyroid: Supple, Non-tender  Respiratory: Clear to auscultation bilaterally.  Cardiovascular: Regular rate and rhythm, S1/S2 normal  Gastrointestinal: Soft, Non-tender, Non-distended, Normal bowel sounds, No palpable organomegaly.  Musculoskeletal: Normal range of motion.  Integumentary: Warm, Dry, Intact  Extremities: No clubbing, cyanosis or edema  Neurologic: No focal deficits, Cranial Nerves II-XII are grossly intact, Motor strength normal upper and lower extremities, Sensory exam intact.  Psychiatric: Normal interaction, Coherent speech    Assessment:       ICD-10-CM ICD-9-CM   1. Frequent headaches  R51.9 784.0        Plan:     1. Frequent headaches  Check studies " management based upon results.  Pathophysiology/treatment/dangers discussed.    - MRI Brain Without Contrast; Future         Follow up if symptoms worsen or fail to improve. In addition to their scheduled follow up, the patient has also been instructed to follow up on as needed basis.     Future Appointments   Date Time Provider Department Center   5/28/2024  9:30 AM Sera Allen MD [facility]   6/25/2024 10:00 AM Darius Winter Jr., MD [facility]        Sera Allen MD

## 2024-01-26 ENCOUNTER — HOSPITAL ENCOUNTER (OUTPATIENT)
Dept: RADIOLOGY | Facility: HOSPITAL | Age: 59
Discharge: HOME | End: 2024-01-26
Attending: FAMILY MEDICINE
Payer: COMMERCIAL

## 2024-01-26 DIAGNOSIS — R51.9 FREQUENT HEADACHES: ICD-10-CM

## 2024-01-26 PROCEDURE — 70551 MRI BRAIN STEM W/O DYE: CPT | Mod: TC

## 2024-02-01 DIAGNOSIS — R73.03 PREDIABETES: Primary | ICD-10-CM

## 2024-02-01 RX ORDER — METFORMIN HYDROCHLORIDE 500 MG/1
500 TABLET ORAL
Qty: 90 TABLET | Refills: 1 | Status: SHIPPED | OUTPATIENT
Start: 2024-02-01

## 2024-03-07 ENCOUNTER — OFFICE VISIT (OUTPATIENT)
Dept: URGENT CARE | Facility: CLINIC | Age: 59
End: 2024-03-07
Payer: COMMERCIAL

## 2024-03-07 VITALS
RESPIRATION RATE: 18 BRPM | HEIGHT: 62 IN | DIASTOLIC BLOOD PRESSURE: 84 MMHG | OXYGEN SATURATION: 99 % | BODY MASS INDEX: 39.01 KG/M2 | TEMPERATURE: 99 F | HEART RATE: 64 BPM | WEIGHT: 212 LBS | SYSTOLIC BLOOD PRESSURE: 136 MMHG

## 2024-03-07 DIAGNOSIS — R51.9 LEFT FACIAL PAIN: Primary | ICD-10-CM

## 2024-03-07 PROCEDURE — 99213 OFFICE O/P EST LOW 20 MIN: CPT | Mod: ,,, | Performed by: NURSE PRACTITIONER

## 2024-03-07 RX ORDER — DOXYCYCLINE 100 MG/1
100 CAPSULE ORAL 2 TIMES DAILY
Qty: 14 CAPSULE | Refills: 0 | Status: SHIPPED | OUTPATIENT
Start: 2024-03-07 | End: 2024-03-14

## 2024-03-07 RX ORDER — PREDNISONE 20 MG/1
20 TABLET ORAL 2 TIMES DAILY
Qty: 10 TABLET | Refills: 0 | Status: SHIPPED | OUTPATIENT
Start: 2024-03-07 | End: 2024-03-12

## 2024-03-07 NOTE — PROGRESS NOTES
"Subjective:      Patient ID: Rae Gudino is a 58 y.o. female.    Vitals:  height is 5' 2" (1.575 m) and weight is 96.2 kg (212 lb). Her temperature is 98.5 °F (36.9 °C). Her blood pressure is 136/84 and her pulse is 64. Her respiration is 18 and oxygen saturation is 99%.     Chief Complaint: throat problem (Pt presents to clinic with c/o burning/discomfort to left side of throat radiating to ear starting an hour ago. )    This is a 58-year-old female presents to urgent care with a 1-1/2 hour history of left-sided throat and ear pain.  States occurring after drinking water and starting to eat breakfast.  Denies any shortness a breath she does state the pain is very intense and every time she swallows she has left throat and deep left ear pain.  Denies any difficulty breathing shortness a breath or any contact with any allergens.  Current medication she took this morning were Singulair and amlodipine.        HENT:  Positive for ear pain and sore throat.       Objective:     Physical Exam   Constitutional: She is oriented to person, place, and time. She appears well-developed. She is cooperative.  Non-toxic appearance. She does not appear ill. No distress.   HENT:   Head: Normocephalic and atraumatic.   Ears:   Right Ear: Hearing, tympanic membrane, external ear and ear canal normal.   Left Ear: Hearing, tympanic membrane, external ear and ear canal normal.   Nose: Nose normal. No mucosal edema, rhinorrhea or nasal deformity. No epistaxis. Right sinus exhibits no maxillary sinus tenderness and no frontal sinus tenderness. Left sinus exhibits no maxillary sinus tenderness and no frontal sinus tenderness.   Mouth/Throat: Uvula is midline, oropharynx is clear and moist and mucous membranes are normal. No trismus in the jaw. Normal dentition. No uvula swelling. No oropharyngeal exudate, posterior oropharyngeal edema or posterior oropharyngeal erythema.      Comments: Questionable dental lluvia noted to the left upper " molar area.  Eyes: Conjunctivae and lids are normal. No scleral icterus.   Neck: Trachea normal and phonation normal. Neck supple. No edema present. No erythema present. No neck rigidity present.   Cardiovascular: Normal rate, regular rhythm, normal heart sounds and normal pulses.   Pulmonary/Chest: Effort normal and breath sounds normal. No respiratory distress. She has no decreased breath sounds. She has no rhonchi.   Abdominal: Normal appearance.   Musculoskeletal: Normal range of motion.         General: No deformity. Normal range of motion.   Neurological: She is alert and oriented to person, place, and time. She exhibits normal muscle tone. Coordination normal.   Skin: Skin is warm, dry, intact, not diaphoretic and not pale.   Psychiatric: Her speech is normal and behavior is normal. Judgment and thought content normal.   Nursing note and vitals reviewed.      Assessment:     1. Left facial pain        Plan:   After examination with completely normal oropharynx and no signs of any lymphadenopathy or any questionable findings of her ear a very noticeable dental lluvia was noted to the left molar, discussed with patient the pain she may be suffering maybe from a nerve involvement of this dental lluvia.  She wishes to proceed home to take ibuprofen and monitor for further issues and we will fill antibiotics and steroids today.  Discussed that if she had any further issues or worsening pain or new onset of shortness a breath to please seek medical treatment immediately sent her symptoms just began and no other signs or symptoms of a cause were identified other than a potential cavity involving or nerve.    Left facial pain    Other orders  -     doxycycline (VIBRAMYCIN) 100 MG Cap; Take 1 capsule (100 mg total) by mouth 2 (two) times daily. for 7 days  Dispense: 14 capsule; Refill: 0  -     predniSONE (DELTASONE) 20 MG tablet; Take 1 tablet (20 mg total) by mouth 2 (two) times daily. for 5 days  Dispense: 10 tablet;  Refill: 0

## 2024-03-07 NOTE — PATIENT INSTRUCTIONS
Medications sent to pharmacy begin taking antibiotics today with food and take as directed until completion   Prednisone also sent to pharmacy take this with food as directed twice a day for 5 days   Use Advil or Motrin 600 mg as needed for pain every 6 hours  As we discussed please follow up with your dentist as soon as there is an available opening for further evaluation of the left upper molar  If any of your symptoms would worsen or you develop any new onset of shortness a breath difficulty swallowing or worsening pain please seek medical attention immediately in the nearest emergency room.

## 2024-04-10 DIAGNOSIS — E78.2 MIXED HYPERLIPIDEMIA: Primary | ICD-10-CM

## 2024-04-10 RX ORDER — ATORVASTATIN CALCIUM 20 MG/1
20 TABLET, FILM COATED ORAL
Qty: 90 TABLET | Refills: 1 | Status: SHIPPED | OUTPATIENT
Start: 2024-04-10

## 2024-05-03 ENCOUNTER — LAB VISIT (OUTPATIENT)
Dept: LAB | Facility: HOSPITAL | Age: 59
End: 2024-05-03
Attending: FAMILY MEDICINE
Payer: COMMERCIAL

## 2024-05-03 DIAGNOSIS — E04.1 THYROID NODULE: ICD-10-CM

## 2024-05-03 DIAGNOSIS — Z00.00 WELLNESS EXAMINATION: ICD-10-CM

## 2024-05-03 LAB
ALBUMIN SERPL-MCNC: 3.6 G/DL (ref 3.5–5)
ALBUMIN/GLOB SERPL: 1 RATIO (ref 1.1–2)
ALP SERPL-CCNC: 71 UNIT/L (ref 40–150)
ALT SERPL-CCNC: 17 UNIT/L (ref 0–55)
AST SERPL-CCNC: 18 UNIT/L (ref 5–34)
BASOPHILS # BLD AUTO: 0.03 X10(3)/MCL
BASOPHILS NFR BLD AUTO: 0.6 %
BILIRUB SERPL-MCNC: 0.3 MG/DL
BUN SERPL-MCNC: 11 MG/DL (ref 9.8–20.1)
CALCIUM SERPL-MCNC: 8.8 MG/DL (ref 8.4–10.2)
CHLORIDE SERPL-SCNC: 108 MMOL/L (ref 98–107)
CHOLEST SERPL-MCNC: 170 MG/DL
CHOLEST/HDLC SERPL: 4 {RATIO} (ref 0–5)
CO2 SERPL-SCNC: 26 MMOL/L (ref 22–29)
CREAT SERPL-MCNC: 0.79 MG/DL (ref 0.55–1.02)
EOSINOPHIL # BLD AUTO: 0.29 X10(3)/MCL (ref 0–0.9)
EOSINOPHIL NFR BLD AUTO: 6.2 %
ERYTHROCYTE [DISTWIDTH] IN BLOOD BY AUTOMATED COUNT: 13 % (ref 11.5–17)
EST. AVERAGE GLUCOSE BLD GHB EST-MCNC: 114 MG/DL
GFR SERPLBLD CREATININE-BSD FMLA CKD-EPI: >60 MLS/MIN/1.73/M2
GLOBULIN SER-MCNC: 3.6 GM/DL (ref 2.4–3.5)
GLUCOSE SERPL-MCNC: 87 MG/DL (ref 74–100)
HBA1C MFR BLD: 5.6 %
HCT VFR BLD AUTO: 41.1 % (ref 37–47)
HCV AB SERPL QL IA: NONREACTIVE
HDLC SERPL-MCNC: 38 MG/DL (ref 35–60)
HGB BLD-MCNC: 13.2 G/DL (ref 12–16)
HIV 1+2 AB+HIV1 P24 AG SERPL QL IA: NONREACTIVE
IMM GRANULOCYTES # BLD AUTO: 0.01 X10(3)/MCL (ref 0–0.04)
IMM GRANULOCYTES NFR BLD AUTO: 0.2 %
LDLC SERPL CALC-MCNC: 99 MG/DL (ref 50–140)
LYMPHOCYTES # BLD AUTO: 1.93 X10(3)/MCL (ref 0.6–4.6)
LYMPHOCYTES NFR BLD AUTO: 41 %
MCH RBC QN AUTO: 27.1 PG (ref 27–31)
MCHC RBC AUTO-ENTMCNC: 32.1 G/DL (ref 33–36)
MCV RBC AUTO: 84.4 FL (ref 80–94)
MONOCYTES # BLD AUTO: 0.29 X10(3)/MCL (ref 0.1–1.3)
MONOCYTES NFR BLD AUTO: 6.2 %
NEUTROPHILS # BLD AUTO: 2.16 X10(3)/MCL (ref 2.1–9.2)
NEUTROPHILS NFR BLD AUTO: 45.8 %
NRBC BLD AUTO-RTO: 0 %
PLATELET # BLD AUTO: 217 X10(3)/MCL (ref 130–400)
PMV BLD AUTO: 10.9 FL (ref 7.4–10.4)
POTASSIUM SERPL-SCNC: 4.1 MMOL/L (ref 3.5–5.1)
PROT SERPL-MCNC: 7.2 GM/DL (ref 6.4–8.3)
RBC # BLD AUTO: 4.87 X10(6)/MCL (ref 4.2–5.4)
SODIUM SERPL-SCNC: 141 MMOL/L (ref 136–145)
T4 FREE SERPL-MCNC: 0.96 NG/DL (ref 0.7–1.48)
TRIGL SERPL-MCNC: 167 MG/DL (ref 37–140)
TSH SERPL-ACNC: 0.29 UIU/ML (ref 0.35–4.94)
VLDLC SERPL CALC-MCNC: 33 MG/DL
WBC # SPEC AUTO: 4.71 X10(3)/MCL (ref 4.5–11.5)

## 2024-05-03 PROCEDURE — 83036 HEMOGLOBIN GLYCOSYLATED A1C: CPT

## 2024-05-03 PROCEDURE — 36415 COLL VENOUS BLD VENIPUNCTURE: CPT

## 2024-05-03 PROCEDURE — 84443 ASSAY THYROID STIM HORMONE: CPT

## 2024-05-03 PROCEDURE — 86803 HEPATITIS C AB TEST: CPT

## 2024-05-03 PROCEDURE — 84439 ASSAY OF FREE THYROXINE: CPT

## 2024-05-03 PROCEDURE — 85025 COMPLETE CBC W/AUTO DIFF WBC: CPT

## 2024-05-03 PROCEDURE — 80061 LIPID PANEL: CPT

## 2024-05-03 PROCEDURE — 80053 COMPREHEN METABOLIC PANEL: CPT

## 2024-05-03 PROCEDURE — 87389 HIV-1 AG W/HIV-1&-2 AB AG IA: CPT

## 2024-05-12 NOTE — PROGRESS NOTES
Patient ID: [id number]     Chief Complaint:  Wellness visit      HPI:     Rae Gudino is a 59 y.o. female here today for an annual wellness. Reviewed and discussed lab results.   Has had a few episodes of nosebleed-usually occurring on the right nostril-not related to activity-no other systemic symptoms-such as headache, dizziness, visual changes, URI symptoms-does have problems with seasonal allergies has been using nasal spray.  Patient does have appointment with ENT  1) Hypertension: Patient has been taking medicine daily. BP is normal at home. No symptoms associated with increased BP such as headache/ visual changes/ dizziness/ chest pain.   2) Hyperlipidemia: Patient is taking medication at Night. Trying to follow modify diet. Increase activity. No side effects of medication noted.  3) Prediabetic: Patient is taking metformin in order to help prevent diabetes-no side effects of medicine noticeable  4)  Reflux: Patient is continuing to take medication-no symptoms associated with reflux.  No noticeable side effects of medication.        Past Medical History:   Diagnosis Date    Essential hypertension 2022    Gastric reflux 2023    Migraine with aura and without status migrainosus, not intractable 2023    Mixed hyperlipidemia 2022    Prediabetes 2022    Seasonal allergies 2022        Past Surgical History:   Procedure Laterality Date    APPENDECTOMY  over 30 years    was in for another surgery and dr decided to remove it     SECTION      gallstone removal      HYSTERECTOMY      partial    PARTIAL HYSTERECTOMY      REPAIR OF MENISCUS OF KNEE Left     TUBAL LIGATION          Social History     Tobacco Use    Smoking status: Never    Smokeless tobacco: Never   Substance Use Topics    Alcohol use: Yes     Alcohol/week: 1.0 standard drink of alcohol     Types: 1 Glasses of wine per week     Comment: likely once/twice every few months    Drug use: Never        Social  "History     Socioeconomic History    Marital status:      Spouse name: Crispin    Number of children: 4        Current Outpatient Medications   Medication Instructions    albuterol (PROVENTIL/VENTOLIN HFA) 90 mcg/actuation inhaler 2 puff    amLODIPine (NORVASC) 5 mg, Oral, Daily    atorvastatin (LIPITOR) 20 mg, Oral    fluticasone propionate (FLONASE) 50 mcg/actuation nasal spray 1 spray in each nostril    gabapentin (NEURONTIN) 300 mg, Oral, 3 times daily    metFORMIN (GLUCOPHAGE) 500 mg, Oral    montelukast (SINGULAIR) 10 mg tablet 1 tablet    omeprazole (PRILOSEC) 40 mg, Oral, Every morning    rizatriptan (MAXALT-MLT) 10 MG disintegrating tablet DISSOLVE 1 TABLET ON THE TONGUE AND ALLOW TO DISSOLVE AS NEEDED ONE TIME DAILY    varicella-zoster gE-AS01B, PF, (SHINGRIX, PF,) 50 mcg/0.5 mL injection 0.5 mLs, Intramuscular, Once       Review of patient's allergies indicates:   Allergen Reactions    Penicillins Rash        Patient Care Team:  Sera Allen MD as PCP - General (Family Medicine)  Sb Sanford MD as Consulting Physician (Orthopedic Surgery)  Lyn Alas MD (Neurosurgery)  Darius Winter Jr., MD as Consulting Physician (Otolaryngology)  Don Arana MD as Resident (Hand Surgery)     Subjective:     Review of Systems    12 point review of systems conducted, negative except as stated in the history of present illness. See HPI for details.      Objective:     Visit Vitals  /85 (BP Location: Left arm, Patient Position: Sitting, BP Method: Medium (Manual))   Pulse 85   Resp 18   Ht 5' 2" (1.575 m)   Wt 94.6 kg (208 lb 9.6 oz)   SpO2 96%   BMI 38.15 kg/m²       Physical Exam    General: Alert and oriented, No acute distress.  Head: Normocephalic, Atraumatic.  Eye: Pupils are equal, round and reactive to light, Extraocular movements are intact, Sclera non-icteric.  Ears/Nose/Throat: Normal, Mucosa moist,Clear.  Neck/Thyroid: Supple, Non-tender, No palpable thyromegaly or thyroid nodule, " No lymphadenopathy, No JVD, Full range of motion.  Respiratory: Clear to auscultation bilaterally; No wheezes, rales or rhonchi,Non-labored respirations, Symmetrical chest wall expansion.  Cardiovascular: Regular rate and rhythm, S1/S2 normal, No murmurs, rubs or gallops.  Gastrointestinal: Soft, Non-tender, Non-distended, Normal bowel sounds, No palpable organomegaly.  Musculoskeletal: Normal range of motion.  Integumentary: Warm, Dry, Intact, No suspicious lesions or rashes.  Extremities: No clubbing, cyanosis or edema  Neurologic: No focal deficits, Cranial Nerves II-XII are grossly intact, Motor strength normal upper and lower extremities, Sensory exam intact.  Psychiatric: Normal interaction, Coherent speech, Euthymic mood, Appropriate affect       Assessment:       ICD-10-CM ICD-9-CM   1. Wellness examination  Z00.00 V70.0   2. Mixed hyperlipidemia  E78.2 272.2   3. Essential hypertension  I10 401.9   4. Migraine with aura and without status migrainosus, not intractable  G43.109 346.00   5. Seasonal allergies  J30.2 477.9   6. Prediabetes  R73.03 790.29   7. Gastric reflux  K21.9 530.81        Plan:       Cervical Cancer Screening - Patient has had a hysterectomy    Breast Cancer Screening - Mammogram Up to date    Colon Cancer Screening - Colon cancer screening Up to date     Eye Exam - Recommend annually.    Dental Exam - Recommend biannual exams.     Vaccinations -   Immunization History   Administered Date(s) Administered    COVID-19 Vaccine 08/10/2021, 08/31/2021    COVID-19, MRNA, LN-S, PF (Pfizer) (Purple Cap) 08/10/2021, 08/31/2021    Influenza - Quadrivalent - PF *Preferred* (6 months and older) 10/05/2021, 10/11/2023    Influenza - Trivalent - PF (ADULT) 10/05/2021    Immunization guidelines reviewed with the patient.  Encourage patient to prevent disease through immunizations.    1. Wellness examination  Wellness: Five Rules Reviewed:  Healthy community-Relationships/ Water/Nutrition-Real Food,  "Limit Fast Food/ Exercise 20-30 minutes most days of the week/ Mental health- "Is your work a calling or paycheck" Define 'What is your purpose-what matters to you' Stress- Is an Individual Response- Therefore Can be Controlled by the Individual. Meditation/ Immunizations/Multivitamin use-Vitamin D3/ Screenings   - varicella-zoster gE-AS01B, PF, (SHINGRIX, PF,) 50 mcg/0.5 mL injection; Inject 0.5 mLs into the muscle once. for 1 dose  Dispense: 1 each; Refill: 0    2. Mixed hyperlipidemia  Lab Results   Component Value Date    CHOL 170 05/03/2024    LDL 99.00 05/03/2024    TRIG 167 (H) 05/03/2024    HDL 38 05/03/2024     Pathophysiology/treatment/dangers discussed. Patient to continue diet modification/Lipitor 20 mg.   Total cholesterol 170 ( Goal less than 200) HDL 38 ( Goal high as possible) LDL 99 ( Goal less than 100) Triglycerides 167 ( Goal less than 150)    - Comprehensive Metabolic Panel; Future  - Lipid Panel; Future    3. Essential hypertension  Patient has been taking medication. Blood pressure goal of less than 130/80-blood pressure is stable-per patient's request prescription sent to pharmacy. Continue to encourage diet and activity modifications. Including stress management. Pathophysiology/treatment/dangers discussed.    - amLODIPine (NORVASC) 5 MG tablet; Take 1 tablet (5 mg total) by mouth once daily.  Dispense: 90 tablet; Refill: 3  - Ambulatory referral/consult to Sleep Disorders; Future  - CBC Auto Differential; Future    4. Migraine with aura and without status migrainosus, not intractable  Patient is currently stable.  No acute modifications recommended.  Continue with current treatment-Maxalt MLT.      5. Seasonal allergies  Seasonal Allergies: Patient is doing well on allergy medication-continue to recommend use of nasal steroid/antihistamine/Singulair.  Diet and lifestyle modifications discussed.    - montelukast (SINGULAIR) 10 mg tablet; 1 tablet  Dispense: 90 tablet; Refill: 3    6. " Prediabetes  Hemoglobin A1c  .  Lab Results   Component Value Date    HGBA1C 5.6 05/03/2024     Normal less than 5.7 - Diagnosis of Type 2 Diabetes Mellitus at 6.5. Encourage diet and activity modification- patient to continue medication Pathophysiology/treatment/dangers discussed.    - Hemoglobin A1C; Future    7. Gastric reflux  Pathophysiology/treatment/dangers discussed.Patient is taking medication for reflux-made aware of risk associated with medication.  For this patient benefits outweigh the risk.   - omeprazole (PRILOSEC) 40 MG capsule; Take 1 capsule (40 mg total) by mouth every morning.  Dispense: 90 capsule; Refill: 3    8. Abnormal TSH  Pathophysiology/Treatment/ Dangers Discussed.  Patient has appointment with ENT-assess thyroid nodule-recheck labs in 3 months.  - TSH; Future  - T4, Free; Future  - TSH; Future    9. Epistaxis  Pathophysiology/Treatment/ Dangers Discussed.  Patient to follow-up with ENT.          The patient's Health Maintenance was reviewed and the following appears to be due at this time:   Health Maintenance Due   Topic Date Due    TETANUS VACCINE  Never done    Shingles Vaccine (1 of 2) Never done         Follow up in about 6 months (around 11/28/2024). In addition to their scheduled follow up, the patient has also been instructed to follow up on as needed basis.     Future Appointments   Date Time Provider Department Center   6/11/2024  3:30 PM Lyn Alas MD [facility] [place]   6/25/2024 10:00 AM [facility]   11/25/2024 11:45 AM Sera Allen MD [facility]        Sera Allen MD

## 2024-05-16 ENCOUNTER — PATIENT MESSAGE (OUTPATIENT)
Dept: FAMILY MEDICINE | Facility: CLINIC | Age: 59
End: 2024-05-16
Payer: COMMERCIAL

## 2024-05-28 ENCOUNTER — OFFICE VISIT (OUTPATIENT)
Dept: FAMILY MEDICINE | Facility: CLINIC | Age: 59
End: 2024-05-28
Payer: COMMERCIAL

## 2024-05-28 VITALS
HEIGHT: 62 IN | WEIGHT: 208.63 LBS | OXYGEN SATURATION: 96 % | RESPIRATION RATE: 18 BRPM | SYSTOLIC BLOOD PRESSURE: 138 MMHG | DIASTOLIC BLOOD PRESSURE: 85 MMHG | HEART RATE: 85 BPM | BODY MASS INDEX: 38.39 KG/M2

## 2024-05-28 DIAGNOSIS — R04.0 EPISTAXIS: ICD-10-CM

## 2024-05-28 DIAGNOSIS — E78.2 MIXED HYPERLIPIDEMIA: ICD-10-CM

## 2024-05-28 DIAGNOSIS — Z00.00 WELLNESS EXAMINATION: Primary | ICD-10-CM

## 2024-05-28 DIAGNOSIS — I10 ESSENTIAL HYPERTENSION: ICD-10-CM

## 2024-05-28 DIAGNOSIS — R73.03 PREDIABETES: ICD-10-CM

## 2024-05-28 DIAGNOSIS — G43.109 MIGRAINE WITH AURA AND WITHOUT STATUS MIGRAINOSUS, NOT INTRACTABLE: ICD-10-CM

## 2024-05-28 DIAGNOSIS — J30.2 SEASONAL ALLERGIES: ICD-10-CM

## 2024-05-28 DIAGNOSIS — K21.9 GASTRIC REFLUX: ICD-10-CM

## 2024-05-28 DIAGNOSIS — R79.89 ABNORMAL TSH: ICD-10-CM

## 2024-05-28 PROCEDURE — 1159F MED LIST DOCD IN RCRD: CPT | Mod: CPTII,,, | Performed by: FAMILY MEDICINE

## 2024-05-28 PROCEDURE — 3075F SYST BP GE 130 - 139MM HG: CPT | Mod: CPTII,,, | Performed by: FAMILY MEDICINE

## 2024-05-28 PROCEDURE — 1160F RVW MEDS BY RX/DR IN RCRD: CPT | Mod: CPTII,,, | Performed by: FAMILY MEDICINE

## 2024-05-28 PROCEDURE — 3079F DIAST BP 80-89 MM HG: CPT | Mod: CPTII,,, | Performed by: FAMILY MEDICINE

## 2024-05-28 PROCEDURE — 3008F BODY MASS INDEX DOCD: CPT | Mod: CPTII,,, | Performed by: FAMILY MEDICINE

## 2024-05-28 PROCEDURE — 3044F HG A1C LEVEL LT 7.0%: CPT | Mod: CPTII,,, | Performed by: FAMILY MEDICINE

## 2024-05-28 PROCEDURE — 99396 PREV VISIT EST AGE 40-64: CPT | Mod: 25,,, | Performed by: FAMILY MEDICINE

## 2024-05-28 RX ORDER — ZOSTER VACCINE RECOMBINANT, ADJUVANTED 50 MCG/0.5
0.5 KIT INTRAMUSCULAR ONCE
Qty: 1 EACH | Refills: 0 | Status: SHIPPED | OUTPATIENT
Start: 2024-05-28 | End: 2024-05-28

## 2024-05-28 RX ORDER — AMLODIPINE BESYLATE 5 MG/1
5 TABLET ORAL DAILY
Qty: 90 TABLET | Refills: 3 | Status: SHIPPED | OUTPATIENT
Start: 2024-05-28 | End: 2025-05-28

## 2024-05-28 RX ORDER — MONTELUKAST SODIUM 10 MG/1
TABLET ORAL
Qty: 90 TABLET | Refills: 3 | Status: SHIPPED | OUTPATIENT
Start: 2024-05-28

## 2024-05-28 RX ORDER — OMEPRAZOLE 40 MG/1
40 CAPSULE, DELAYED RELEASE ORAL EVERY MORNING
Qty: 90 CAPSULE | Refills: 3 | Status: SHIPPED | OUTPATIENT
Start: 2024-05-28 | End: 2025-05-28

## 2024-06-11 ENCOUNTER — TELEPHONE (OUTPATIENT)
Dept: NEUROSURGERY | Facility: CLINIC | Age: 59
End: 2024-06-11

## 2024-06-11 DIAGNOSIS — M47.812 CERVICAL SPONDYLOSIS: ICD-10-CM

## 2024-06-11 DIAGNOSIS — R20.0 BILATERAL HAND NUMBNESS: Primary | ICD-10-CM

## 2024-06-11 DIAGNOSIS — G56.03 BILATERAL CARPAL TUNNEL SYNDROME: ICD-10-CM

## 2024-06-11 NOTE — TELEPHONE ENCOUNTER
I discussed this case with Dr. Alas. She feels it would be more appropriate for the patient to be r/s so additional testing can be performed to further evaluate what is going on. I did speak with the patient to advise her of this. She verbalized understanding. I will cancel the appointment for today and will send this over to Dr. Alas so she can advise on additional testing.

## 2024-06-11 NOTE — TELEPHONE ENCOUNTER
I spoke with the patient in regards to the appointment she scheduled with Dr. Alas for today at 3:30. I inquired about the numbness and pain she is having. She stated she has not really been experiencing neck pain but did have some discomfort in her neck yesterday but just brushed it off. She has numbness and pain in her left hand that radiates up into her arm. She does have this same pain in her right hand but states it is more prominent on the left. Her pain is really bad whenever she first wakes up in the morning. She has not had any recent accidents or falls that would contribute to this pain. She used to take Gabapentin qhs but D/C taking it because it is not working for her anymore. She has not had any recent testing since MRI in 6/2023 and denies seeing any other providers for this. I did advise her that I would relay this to Dr. Alas and will be back in touch if further testing is needed.

## 2024-06-17 NOTE — TELEPHONE ENCOUNTER
I am requesting Marcia to contact Ms. Gudino regarding her R>L hand numbness symptoms, which sound consistent with progressively worsening bilateral carpal tunnel syndrome.      During her last evaluation in my neurosurgery clinic on 10/26/2023, she was confirmed to have mild degenerative findings in her cervical spine.  Her EMG/NCV of the bilateral upper extremities was completed more than 1 year ago on 06/14/2023 by Dr. Boggs, which demonstrated bilateral moderate carpal tunnel syndrome with chronic left C7 radiculopathy.        My recommendations are as follows:    1.   Ms. Gudino had previously been followed by orthopedic hand specialist Dr. Arana, who had administered a cortisone shot in her left wrist.  There was noticeable postprocedural improvement.  The patient has also been using a left wrist splint, which I encourage her to continue wearing.      2.  Dr. Arana will no longer be practicing in the local area.  I perform carpal tunnel evaluations and surgeries, so I am happy to take over her care regarding this condition as well as her chronic neck and low back pain.      3.  I am ordering an updated EMG/NCV of her bilateral upper extremities, with an order that is being submitted to pain management specialist Dr. Duke.  If he does not take her medical insurance plan, I would like this EMG/NCV to be completed by another physician that is able to accommodate.     4.  Arrangements need to be made for Ms. Gudino to follow up in my neurosurgery clinic in 2-3 weeks, with the criteria being that she has completed her EMG/NCV study with a finalized report.      Orders Placed This Encounter   Procedures    Ambulatory referral/consult to Pain Clinic

## 2024-06-17 NOTE — TELEPHONE ENCOUNTER
I spoke with the patient to advise her of Dr. Alas's recommendations. She verbalized understanding. I scheduled her to see Dr. Alas for 7/25/24 at 2:00. This is the first date that is open and works well with her schedule. I did advise her that I will be faxing the EMG referral to Dr. Duke and will be in touch with her in about a week to ensure she has been contacted. She verbalized understanding.

## 2024-06-17 NOTE — TELEPHONE ENCOUNTER
Margaret sent me a message. The patient is scheduled for her initial eval with Dr. Duke on 6/24/24 at 1:30. I will F/U with her after this appointment to see when the actual EMG is scheduled.

## 2024-06-25 ENCOUNTER — OFFICE VISIT (OUTPATIENT)
Dept: SURGICAL ONCOLOGY | Facility: CLINIC | Age: 59
End: 2024-06-25
Payer: COMMERCIAL

## 2024-06-25 VITALS
HEIGHT: 62 IN | SYSTOLIC BLOOD PRESSURE: 129 MMHG | BODY MASS INDEX: 39.16 KG/M2 | WEIGHT: 212.81 LBS | DIASTOLIC BLOOD PRESSURE: 84 MMHG

## 2024-06-25 DIAGNOSIS — J35.01 CHRONIC TONSILLITIS: ICD-10-CM

## 2024-06-25 DIAGNOSIS — J30.2 SEASONAL ALLERGIES: ICD-10-CM

## 2024-06-25 DIAGNOSIS — G47.33 OSA (OBSTRUCTIVE SLEEP APNEA): ICD-10-CM

## 2024-06-25 DIAGNOSIS — E04.1 LEFT THYROID NODULE: Primary | ICD-10-CM

## 2024-06-25 DIAGNOSIS — R04.0 EPISTAXIS: ICD-10-CM

## 2024-06-25 PROCEDURE — 3074F SYST BP LT 130 MM HG: CPT | Mod: CPTII,S$GLB,, | Performed by: OTOLARYNGOLOGY

## 2024-06-25 PROCEDURE — 1159F MED LIST DOCD IN RCRD: CPT | Mod: CPTII,S$GLB,, | Performed by: OTOLARYNGOLOGY

## 2024-06-25 PROCEDURE — 99215 OFFICE O/P EST HI 40 MIN: CPT | Mod: S$GLB,,, | Performed by: OTOLARYNGOLOGY

## 2024-06-25 PROCEDURE — 3044F HG A1C LEVEL LT 7.0%: CPT | Mod: CPTII,S$GLB,, | Performed by: OTOLARYNGOLOGY

## 2024-06-25 PROCEDURE — 3008F BODY MASS INDEX DOCD: CPT | Mod: CPTII,S$GLB,, | Performed by: OTOLARYNGOLOGY

## 2024-06-25 PROCEDURE — 3079F DIAST BP 80-89 MM HG: CPT | Mod: CPTII,S$GLB,, | Performed by: OTOLARYNGOLOGY

## 2024-06-25 PROCEDURE — 99999 PR PBB SHADOW E&M-EST. PATIENT-LVL III: CPT | Mod: PBBFAC,,, | Performed by: OTOLARYNGOLOGY

## 2024-06-25 NOTE — ASSESSMENT & PLAN NOTE
I do not see any obvious source of bleeding from the right side of the nose that she describes.  I will start her on some saline gel spray.

## 2024-06-25 NOTE — ASSESSMENT & PLAN NOTE
I will start her on some myalgias to try to help with some of her symptoms and the inferior turbinate hypertrophy.  She was directed as to how to spray this to avoid her septum.

## 2024-06-25 NOTE — ASSESSMENT & PLAN NOTE
She has significant tonsillar hypertrophy secondary to a chronic tonsillitis, and I think that this is contributing to her sleep symptoms.

## 2024-06-25 NOTE — PROGRESS NOTES
[facility]  Head and Neck Surgical Oncology Clinic     Patient ID: [id number]     Chief Complaint: 1year f/up for Envision U/S prior to visit       HPI:     Rae Gudino is a 59 y.o. female here today for follow-up from a thyroid ultrasound.  A year ago she had a benign biopsy.  The left-sided thyroid nodule has only gone from 3.2 cm to 3.4 cm, largely unchanged.    She is noting some intermittent right-sided epistaxis.  No other symptoms of concern.  She does not breathe very well through her nose.  She has been told that she snores and has apneic pauses.  She also has been told that she has large tonsils.    Past Medical History:   Diagnosis Date    Essential hypertension 2022    Gastric reflux 2023    Migraine with aura and without status migrainosus, not intractable 2023    Mixed hyperlipidemia 2022    Prediabetes 2022    Seasonal allergies 2022        Past Surgical History:   Procedure Laterality Date    APPENDECTOMY  over 30 years    was in for another surgery and dr decided to remove it     SECTION      gallstone removal      HYSTERECTOMY      partial    PARTIAL HYSTERECTOMY      REPAIR OF MENISCUS OF KNEE Left     TUBAL LIGATION          Social History     Tobacco Use    Smoking status: Never    Smokeless tobacco: Never   Substance and Sexual Activity    Alcohol use: Yes     Alcohol/week: 1.0 standard drink of alcohol     Types: 1 Glasses of wine per week     Comment: likely once/twice every few months    Drug use: Never    Sexual activity: Yes     Partners: Male     Birth control/protection: See Surgical Hx     Comment: with spouse only        Current Outpatient Medications   Medication Instructions    albuterol (PROVENTIL/VENTOLIN HFA) 90 mcg/actuation inhaler 2 puff    amLODIPine (NORVASC) 5 mg, Oral, Daily    atorvastatin (LIPITOR) 20 mg, Oral    fluticasone propionate (FLONASE) 50 mcg/actuation nasal spray 1 spray in each nostril     "gabapentin (NEURONTIN) 300 mg, Oral, 3 times daily    metFORMIN (GLUCOPHAGE) 500 mg, Oral    montelukast (SINGULAIR) 10 mg tablet 1 tablet    omeprazole (PRILOSEC) 40 mg, Oral, Every morning    rizatriptan (MAXALT-MLT) 10 MG disintegrating tablet DISSOLVE 1 TABLET ON THE TONGUE AND ALLOW TO DISSOLVE AS NEEDED ONE TIME DAILY       Review of patient's allergies indicates:   Allergen Reactions    Penicillins Rash        Patient Care Team:  Sera Allen MD as PCP - General (Family Medicine)  Sb Sanford MD as Consulting Physician (Orthopedic Surgery)  Lyn Alas MD (Neurosurgery)  Darius Winter Jr., MD as Consulting Physician (Otolaryngology)  Don Arana MD as Resident (Hand Surgery)     Subjective:     Review of Systems    12 point review of systems conducted, negative except as stated in the history of present illness. See HPI for details.    Objective:     Visit Vitals  /84   Pulse (P) 60   Ht 5' 2" (1.575 m)   Wt 96.5 kg (212 lb 12.8 oz)   BMI 38.92 kg/m²       ENT Physical Exam     General: AOx3, NAD  Cardiac: Well perfused  Respiratory: Breathing without stridor or distress  Right Ear: External Auditory Canal WNL,TM w/o masses/lesions/perforations  Left Ear:  External Auditory Canal WNL,TM w/o masses/lesions/perforations  Nose:  Right-sided septal spur.  No obvious source of epistaxis..  Bilateral inferior turbinate hypertrophy.  No septal perforation.  No masses/lesions.  Oral Cavity: FOM Soft, no masses palpated.  Oral Tongue mobile.  Hard Palate WNL.  Oropharynx: BOT WNL.  No masses/lesions noted.  Tonsillar fossa without lesions.  Soft palate without masses.  Midline uvula.  3+ almost 4+ cryptic tonsils  Neck: No palpable lymphadenopathy at I - VI.    Face: House Brackmann I bilaterally.  Eyes: Normal extra ocular motion bilaterally.        Diagnostic Imaging:     All pertinent head and neck imaging independently reviewed. Discussed these findings in detail with the " patient.    Assessment:       ICD-10-CM ICD-9-CM   1. Left thyroid nodule  E04.1 241.0   2. GAETANO (obstructive sleep apnea)  G47.33 327.23   3. Epistaxis  R04.0 784.7   4. Seasonal allergies  J30.2 477.9   5. Chronic tonsillitis  J35.01 474.00        Plan:     1. Left thyroid nodule  Overview:  1. Left-sided thyroid nodule, measuring 3.2 cm in March of 2023, with FNA that was benign  2. Recent thyroid ultrasound in June of 2024, noting a 3.4 cm largely unchanged thyroid nodule       2. GAETANO (obstructive sleep apnea)  Assessment & Plan:  Given her significant snoring symptoms and apneic pauses, we will get a home sleep study.      3. Epistaxis  Assessment & Plan:  I do not see any obvious source of bleeding from the right side of the nose that she describes.  I will start her on some saline gel spray.      4. Seasonal allergies  Assessment & Plan:  I will start her on some myalgias to try to help with some of her symptoms and the inferior turbinate hypertrophy.  She was directed as to how to spray this to avoid her septum.      5. Chronic tonsillitis  Assessment & Plan:  She has significant tonsillar hypertrophy secondary to a chronic tonsillitis, and I think that this is contributing to her sleep symptoms.           No follow-ups on file. In addition to their scheduled follow up, the patient has also been instructed to follow up on as needed basis.     Future Appointments   Date Time Provider Department Center   7/25/2024  2:00 PM Lyn Alas MD [facility] [place]   11/25/2024 11:45 AM Sera Allen MD [facility]   6/24/2025  9:30 AM Darius Winter Jr., MD [facility] [address] [facility]        Darius Winter Jr, MD

## 2024-06-26 DIAGNOSIS — E04.1 LEFT THYROID NODULE: Primary | ICD-10-CM

## 2024-06-26 DIAGNOSIS — R04.0 EPISTAXIS: Primary | ICD-10-CM

## 2024-06-26 DIAGNOSIS — J30.2 SEASONAL ALLERGIES: ICD-10-CM

## 2024-06-26 RX ORDER — OLOPATADINE HYDROCHLORIDE AND MOMETASONE FUROATE 25; 665 UG/1; UG/1
2 SPRAY, METERED NASAL 2 TIMES DAILY
Qty: 29 G | Refills: 0 | Status: SHIPPED | OUTPATIENT
Start: 2024-06-26

## 2024-07-01 DIAGNOSIS — G56.03 BILATERAL CARPAL TUNNEL SYNDROME: ICD-10-CM

## 2024-07-01 DIAGNOSIS — R20.0 BILATERAL HAND NUMBNESS: ICD-10-CM

## 2024-07-01 DIAGNOSIS — M47.812 CERVICAL SPONDYLOSIS: ICD-10-CM

## 2024-07-09 RX ORDER — GABAPENTIN 300 MG/1
300 CAPSULE ORAL 3 TIMES DAILY
Qty: 90 CAPSULE | Refills: 1 | Status: SHIPPED | OUTPATIENT
Start: 2024-07-09

## 2024-07-11 DIAGNOSIS — G43.109 MIGRAINE WITH AURA AND WITHOUT STATUS MIGRAINOSUS, NOT INTRACTABLE: Primary | ICD-10-CM

## 2024-07-11 RX ORDER — RIZATRIPTAN BENZOATE 10 MG/1
TABLET, ORALLY DISINTEGRATING ORAL
Qty: 12 TABLET | Refills: 1 | Status: SHIPPED | OUTPATIENT
Start: 2024-07-11

## 2024-07-25 ENCOUNTER — OFFICE VISIT (OUTPATIENT)
Dept: NEUROSURGERY | Facility: CLINIC | Age: 59
End: 2024-07-25
Payer: COMMERCIAL

## 2024-07-25 VITALS
WEIGHT: 211.63 LBS | HEART RATE: 62 BPM | HEIGHT: 62 IN | RESPIRATION RATE: 16 BRPM | DIASTOLIC BLOOD PRESSURE: 69 MMHG | BODY MASS INDEX: 38.94 KG/M2 | SYSTOLIC BLOOD PRESSURE: 119 MMHG

## 2024-07-25 DIAGNOSIS — R20.0 BILATERAL HAND NUMBNESS: ICD-10-CM

## 2024-07-25 DIAGNOSIS — G56.03 BILATERAL CARPAL TUNNEL SYNDROME: Primary | ICD-10-CM

## 2024-07-25 PROCEDURE — 3078F DIAST BP <80 MM HG: CPT | Mod: CPTII,,, | Performed by: NEUROLOGICAL SURGERY

## 2024-07-25 PROCEDURE — 1160F RVW MEDS BY RX/DR IN RCRD: CPT | Mod: CPTII,,, | Performed by: NEUROLOGICAL SURGERY

## 2024-07-25 PROCEDURE — 3008F BODY MASS INDEX DOCD: CPT | Mod: CPTII,,, | Performed by: NEUROLOGICAL SURGERY

## 2024-07-25 PROCEDURE — 3044F HG A1C LEVEL LT 7.0%: CPT | Mod: CPTII,,, | Performed by: NEUROLOGICAL SURGERY

## 2024-07-25 PROCEDURE — 3074F SYST BP LT 130 MM HG: CPT | Mod: CPTII,,, | Performed by: NEUROLOGICAL SURGERY

## 2024-07-25 PROCEDURE — 99214 OFFICE O/P EST MOD 30 MIN: CPT | Mod: ,,, | Performed by: NEUROLOGICAL SURGERY

## 2024-07-25 PROCEDURE — 1159F MED LIST DOCD IN RCRD: CPT | Mod: CPTII,,, | Performed by: NEUROLOGICAL SURGERY

## 2024-08-17 ENCOUNTER — OFFICE VISIT (OUTPATIENT)
Dept: URGENT CARE | Facility: CLINIC | Age: 59
End: 2024-08-17
Payer: COMMERCIAL

## 2024-08-17 VITALS
HEIGHT: 62 IN | HEART RATE: 71 BPM | SYSTOLIC BLOOD PRESSURE: 139 MMHG | WEIGHT: 209 LBS | RESPIRATION RATE: 18 BRPM | DIASTOLIC BLOOD PRESSURE: 84 MMHG | OXYGEN SATURATION: 98 % | TEMPERATURE: 99 F | BODY MASS INDEX: 38.46 KG/M2

## 2024-08-17 DIAGNOSIS — K08.89 PAIN, DENTAL: Primary | ICD-10-CM

## 2024-08-17 PROCEDURE — 99213 OFFICE O/P EST LOW 20 MIN: CPT | Mod: 25,,, | Performed by: NURSE PRACTITIONER

## 2024-08-17 PROCEDURE — 96372 THER/PROPH/DIAG INJ SC/IM: CPT | Mod: ,,, | Performed by: NURSE PRACTITIONER

## 2024-08-17 RX ORDER — TRAMADOL HYDROCHLORIDE 50 MG/1
50 TABLET ORAL EVERY 6 HOURS
Qty: 20 TABLET | Refills: 0 | Status: SHIPPED | OUTPATIENT
Start: 2024-08-17

## 2024-08-17 RX ORDER — CHLORHEXIDINE GLUCONATE ORAL RINSE 1.2 MG/ML
15 SOLUTION DENTAL 2 TIMES DAILY
Qty: 118 ML | Refills: 0 | Status: SHIPPED | OUTPATIENT
Start: 2024-08-17 | End: 2024-08-31

## 2024-08-17 RX ORDER — KETOROLAC TROMETHAMINE 30 MG/ML
30 INJECTION, SOLUTION INTRAMUSCULAR; INTRAVENOUS
Status: COMPLETED | OUTPATIENT
Start: 2024-08-17 | End: 2024-08-17

## 2024-08-17 RX ORDER — CLINDAMYCIN HYDROCHLORIDE 300 MG/1
300 CAPSULE ORAL 3 TIMES DAILY
COMMUNITY
Start: 2024-08-17

## 2024-08-17 RX ADMIN — KETOROLAC TROMETHAMINE 30 MG: 30 INJECTION, SOLUTION INTRAMUSCULAR; INTRAVENOUS at 06:08

## 2024-08-17 NOTE — PATIENT INSTRUCTIONS
Continue taking clindamycin recently prescribed by your dentist  antibiotic until complete.  Take Tylenol OTC for pain as directed, tramadol for worsening pain  May start ibuprofen OTC for pain tomorrow as instructed  Warm saltwater gargles, use Orajel topical as instructed  Use prescription Peridex as directed oral rinse  Follow-up with a dentist as soon as possible   Go to ER for facial swelling, worsening symptoms or for concerns as instructed

## 2024-08-17 NOTE — PROGRESS NOTES
"Subjective:      Patient ID: Rae Gudino is a 59 y.o. female.    Vitals:  height is 5' 2" (1.575 m) and weight is 94.8 kg (209 lb). Her temperature is 98.5 °F (36.9 °C). Her blood pressure is 139/84 and her pulse is 71. Her respiration is 18 and oxygen saturation is 98%.     Chief Complaint: Dental Pain     Patient is a 59 y.o. female who presents to urgent care with complaints of facial swelling from dental pain on the left side due to root canal. Patient is taking antibiotics since Wednesday and OTC pain medication isn't helping.  Recently evaluated by a dentist currently on clindamycin.  Noted left-sided facial swelling which has improved since yesterday.  Although uncomfortable.      HENT:  Positive for dental problem.       Objective:     Physical Exam   HENT:   Ears:   Right Ear: Tympanic membrane, external ear and ear canal normal.   Left Ear: Tympanic membrane, external ear and ear canal normal.   Nose: Nose normal.   Mouth/Throat: Dental caries present.       Mild swelling to the left upper rear molars      Comments: Mild swelling to the left upper rear molars  Abdominal: Normal appearance.   Neurological: She is alert.   Nursing note and vitals reviewed.      Assessment:     1. Pain, dental        Plan:   Continue taking clindamycin recently prescribed by your dentist  antibiotic until complete.  Take Tylenol OTC for pain as directed, tramadol for worsening pain  May start ibuprofen OTC tomorrow as directed for pain  Warm saltwater gargles, use Orajel topical as instructed  Use prescription Peridex as directed oral rinse  Follow-up with a dentist as soon as possible   Go to ER for facial swelling, worsening symptoms or for concerns as instructed  Pain, dental  -     ketorolac injection 30 mg  -     chlorhexidine (PERIDEX) 0.12 % solution; Use as directed 15 mLs in the mouth or throat 2 (two) times daily. for 14 days  Dispense: 118 mL; Refill: 0  -     traMADoL (ULTRAM) 50 mg tablet; Take 1 tablet (50 " mg total) by mouth every 6 (six) hours.  Dispense: 20 tablet; Refill: 0

## 2024-08-20 NOTE — PROGRESS NOTES
[facility]  History & Physical  Neurosurgery      Raemonserrat Gudino   [id number]   1965       SUBJECTIVE:     CHIEF COMPLAINT:  Numbness in L > R hands    HPI:  Ms. Gudino is a 59 y.o. right-handed female who has a past medical history significant for hypertension and migraine headache.  She presents as a follow up patient in the neurosurgery clinic for worsening left greater than right hand numbness for approximately 1 year.      The patient's last date of visit in the neurosurgery clinic was on 07/25/2024.  Optimizing medical management was recommended her mild right neck and low back pain.  These symptoms were attributed to arthritis and musculoskeletal spasms.  Her bilateral hand numbness was primarily attributed to carpal tunnel syndrome, especially because her left hand numbness improved after a steroid injection in her left wrist by orthopedic hand specialist Dr. Arana.  Neurontin was prescribed for her numbness symptoms.  She was referred to physical therapy for her right neck and low back pain.  Weight loss goals were discussed.  Ms. Gudino was recommended to follow up with orthopedic hand specialist Dr. Arana for her bilateral carpal tunnel syndrome.       The patient completed a physical therapy program at [facility] on [address].  She continues to do exercises at home.  Her neck and low back pain symptoms have generally improved.  There have been minimal varying weight fluctuations.  However, since her last visit,  Ms. Gudino has been experiencing worsening of her left-greater-than-right hand numbness symptoms.  Dr. Arana is no longer practicing in the local area.  An EMG/NCV of her bilateral upper extremities was completed recently, which confirmed moderate bilateral carpal tunnel syndrome.      The numbness in her left-greater-than-right right forearms, hands, and fingers occurs upon awakening.  Driving also seems to worsen her symptoms.  The patient has to shake her bilateral arms to lessen  "her numbness symptoms, which Ms. Gudino does repeatedly throughout her evaluation.  She has been compliant in wearing bilateral wrist splints without significant improvement.  The patient does not have any radiating pain from her spinal axis into any of her extremities.  There has been no focal weakness or gait instability.  The patient does not have any bowel or bladder incontinence.      Neurontin is effective in reducing her numbness symptoms, although this medication has adverse side effects related to sleepiness.  She takes 600 mg at night.      Patient presents today to discuss surgery and sign consents for upcoming scheduled right open carpal tunnel release. Of note, Ms. Gudino has recently had dental cleanings that have resulted in dental abscess and currently being on antibiotics. She has another dental appointment on 8/29/2024.     She does continue to have mild, chronic intermittent pain neck and lower back, with a current pain level of 2/10.   Low back pain is intermittent and mild.  She sometimes has a sensation of a "knot" in the upper aspect of neck that has pain when she presses in this area.  Ms. Gudino does not have any radiating pain into her extremities and does not have any focal weakness. She has constant numbness in her left-greater-than-right forearms and hands involving all of her fingers.  The patient does not have any wrist pain.  Her numbness symptoms are most noticeable when waking up from sleep, when her hands are also swollen.  Reports compliance with bilateral wrist splints for sleeping. She occasionally drops objects when holding them. The patient has been fully ambulatory with no bowel or bladder incontinence.      Past Medical History:   Diagnosis Date    Carpal tunnel syndrome, bilateral     Essential hypertension 05/20/2022    Gastric reflux 11/27/2023    Left arm numbness     Left arm pain     Migraine with aura and without status migrainosus, not intractable 05/09/2023    Mixed " hyperlipidemia 2022    Obesity, unspecified     Osteoarthritis     Prediabetes 2022    Seasonal allergies 2022    Sleep apnea     awaiting call for virtual apt for equipment       Past Surgical History:   Procedure Laterality Date    APPENDECTOMY  over 30 years    was in for another surgery and dr decided to remove it     SECTION      COLONOSCOPY      gallstone removal      HYSTERECTOMY  2003    partial    MOUTH SURGERY      PARTIAL HYSTERECTOMY      REPAIR OF MENISCUS OF KNEE Left     TUBAL LIGATION         Family History   Problem Relation Name Age of Onset    Diabetes Mother Marilyn Wiseman     Rheum arthritis Mother Marilyn Wiseman     Arthritis Mother Marilyn Wiseman     Hypertension Mother Marilyn Wiseman     Miscarriages / Stillbirths Mother Marilyn Wiseman     Vision loss Mother Marilyn Wiseman         due to diabetes    Prostate cancer Father  60    Multiple sclerosis Sister      Hypertension Maternal Grandmother Zainab Madrid     Stroke Maternal Grandmother Zainab [place]        Social History     Socioeconomic History    Marital status:      Spouse name: Crispin    Number of children: 4   Occupational History    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   Tobacco Use    Smoking status: Never    Smokeless tobacco: Never   Substance and Sexual Activity    Alcohol use: Not Currently     Alcohol/week: 1.0 standard drink of alcohol     Types: 1 Glasses of wine per week     Comment: likely once/twice every few months    Drug use: Never    Sexual activity: Yes     Partners: Male     Birth control/protection: See Surgical Hx     Comment: with spouse only   Social History Narrative    ** Merged History Encounter **         ** Merged History Encounter **             Review of patient's allergies indicates:   Allergen Reactions    Penicillins Rash and Other (See Comments)        Current Outpatient Medications   Medication Instructions    acetaminophen (TYLENOL) 325 mg,  Oral, Every 6 hours PRN    albuterol (PROVENTIL/VENTOLIN HFA) 90 mcg/actuation inhaler 2 puff    amLODIPine (NORVASC) 5 mg, Oral, Daily    amoxicillin-clavulanate 875-125mg (AUGMENTIN) 875-125 mg per tablet TAKE 1 TABLET BY MOUTH IN THE MORNING AND 1 TABLET BEFORE BEDTIME FOR 10 DAYS    atorvastatin (LIPITOR) 20 mg, Oral    chlorhexidine (PERIDEX) 0.12 % solution 15 mLs, Mouth/Throat, 2 times daily    ciprofloxacin HCl (CIPRO) 500 mg, Oral, Every 12 hours (non-standard times), Dental abscess-    clindamycin (CLEOCIN) 150 MG capsule TAKE 1 CAPSULE BY MOUTH THREE TIMES DAILY UNTIL COMPLETE    clindamycin (CLEOCIN) 300 mg, Oral, 3 times daily    doxycycline (VIBRAMYCIN) 100 mg, Oral, 2 times daily    fluticasone propionate (FLONASE) 50 mcg/actuation nasal spray 1 spray in each nostril    gabapentin (NEURONTIN) 300 mg, Oral, 3 times daily    HYDROcodone-acetaminophen (NORCO)  mg per tablet 1 tablet, Oral, Every 4 hours PRN    ketorolac (TORADOL) 10 mg tablet TAKE 1 TABLET BY MOUTH EVERY 6 HOURS AS NEEDED FOR PAIN FOR UP TO 5 DAYS    metFORMIN (GLUCOPHAGE) 500 mg, Oral    montelukast (SINGULAIR) 10 mg tablet 1 tablet    olopatadine-mometasone (RYALTRIS) 665-25 mcg/spray Spry 2 sprays, Nasal, 2 times daily    omeprazole (PRILOSEC) 40 mg, Oral, Every morning    ondansetron (ZOFRAN-ODT) 8 mg, Oral, 2 times daily    oxyCODONE-acetaminophen (PERCOCET)  mg per tablet TAKE 1/2 TO 1 TABLET BY MOUTH EVERY 4 TO 6 HOURS AS NEEDED FOR PAIN . MAXIMUM DAILY DOSE IS 3 TABLETS    rizatriptan (MAXALT-MLT) 10 MG disintegrating tablet DISSOLVE 1 TABLET ON THE TONGUE EVERY DAY AS NEEDED          ROS  Constitutional:  Negative for chills and fever.   HENT:  Negative for sore throat and congestion. Positive for dental abscess and facial swelling.   Eyes:  Negative for blurred vision and double vision.   Respiratory: Negative for cough or shortness of breath.  Cardiovascular:  Negative for chest pain and palpitations.  "  Gastrointestinal:  Negative for constipation, diarrhea, nausea and vomiting.   Musculoskeletal:  Negative for neck pain. Positive for back pain  Neurological:  Positive for sensory change, Negative for focal weakness and headaches.   Endo/Heme/Allergies:  Does not bruise/bleed easily.   Psychiatric/Behavioral:  Negative for depression and anxiety.    OBJECTIVE:     Visit Vitals  /89   Pulse 73   Temp 98.1 °F (36.7 °C)   Resp 16   Ht 5' 2" (1.575 m)   Wt 95.3 kg (210 lb)   BMI 38.41 kg/m²        Physical Exam    General:  The patient is well-developed and cooperative, sitting comfortably in a chair.     Mental Status:   Oriented to person, place, and time  Normal speech    Neurological:  Muscle strength against resistance:   Right Left   Deltoid (C5) 5/5 5/5   Biceps (C5/6) 5/5 5/5   Wrist Flexors (C5/6) 5/5 5/5   Triceps (C7) 5/5 5/5   Wrist extension (C7) 5/5 5/5   Finger abduction (C8) 5/5 5/5    5/5 5/5        Hip abduction 5/5 5/5   Hip adduction 5/5 5/5   Hip flexion (L2) 5/5 5/5   Knee extension (L3) 5/5 5/5   Knee flexion (L4) 5/5 5/5   Dorsiflexion (L5) 5/5 5/5   Plantar flexion (S1) 5/5 5/5     Decreased sensation to light touch in bilateral hands and all fingers  Normal to light touch in bilateral lower extremities    Reflexes:   Right Left   Triceps (C7) 2+ 2+   Biceps (C5) 2+ 2+   Brachioradialis (C6) 2+ 2+   Patellar (L4) 2+ 2+   Achilles (S1) 2+ 2+     Negative Babinski, Clonus, Ruiz, Tinel's, and Phalen's bilaterally.    Musculoskeletal:     Gait: normal   Coordination is normal.  No tremor noted.      ASSESSMENT:       ICD-10-CM ICD-9-CM   1. Preop testing  Z01.818 V72.84   2. Essential hypertension  I10 401.9   3. Prediabetes  R73.03 790.29   4. Dental abscess  K04.7 522.5         PLAN:       Rae Gudino presents today for right carpal tunnel release evaluation. Due to ongoing dental abscess and care, it was discussed with the patient that she needs to complete her antibiotics " and dental care and contact the clinic reschedule her surgery. Patient was agreeable to plan. She will keep her follow ups as scheduled with her dentist and return to clinic once she is recovered from her dental work. When she returns to clinic, surgical consents will be signed at that time as well as preoperative lab work and imaging ordered.     No follow-ups on file.      Shaye Tejeda, CORY-C  Neurosurgery      Disclaimer:  This note is prepared using voice recognition software and as such is likely to have errors despite attempts at proofreading. Please contact me for questions.

## 2024-08-20 NOTE — PATIENT INSTRUCTIONS
CARPAL TUNNEL RELEASE/ ULNAR NERVE RELEASE  DISCHARGE INSTRUCTIONS      1.   Keep your incision clean and dry.    Wait at least 72 hours from the time of your surgery to take a shower.  3 days after your surgery, unwrap the dressings completely and then rewrap with clean gauze and an ACE bandage.  Rewrap around a hand splint if you had a carpal tunnel release.  Do this on a daily basis until your follow-up appointment.   Your sutures will be removed at your first postoperative follow-up appointment in approximately 14 days.   Place a bag over your arm when you take a shower.  Do not immerse your incision in water for 4 weeks (e.g. bath tub, hot tub, swimming pool).    2.  Activity restrictions:  If you had an ulnar nerve release, wear your arm sling whenever you are out of bed.  Try to minimize the use of your surgical hand until you are evaluated at your first postoperative appointment.  No impact exercise or contact sports for at least 4 weeks  No driving for 1 week.  To resume driving short distances (<30 minutes), you must be off of your narcotic medications and be able to comfortably handle the steering wheel suddenly, should the need arise.    Get up and walk.    3.  Contact your Neurosurgeon if the following occurs:  Signs and symptoms of infection, including fever above 101.5 degrees Fahrenheit and/or chills.  Redness, swelling, warmth, or drainage from the incision.  Any lasting changes in sensation, numbness, and/or tingling.  Increased weakness or increased pain.    Office hours Monday through Friday, 8:00 AM to 4:00 PM except for holidays. There is an answering service available during non-office hours, with 24 hours neurosurgery coverage.  Report to the Emergency Department if you need immediate medical assistance.      Please contact Dr. Alas's office for any questions or concerns.  Typically, your first follow-up appointment is approximately 14 days from the date of your operation.   At this time,  sutures will be removed.

## 2024-08-23 ENCOUNTER — LAB REQUISITION (OUTPATIENT)
Dept: LAB | Facility: HOSPITAL | Age: 59
End: 2024-08-23
Payer: COMMERCIAL

## 2024-08-23 DIAGNOSIS — K12.2 CELLULITIS AND ABSCESS OF MOUTH: ICD-10-CM

## 2024-08-23 PROCEDURE — 87075 CULTR BACTERIA EXCEPT BLOOD: CPT | Performed by: DENTIST

## 2024-08-23 PROCEDURE — 87070 CULTURE OTHR SPECIMN AEROBIC: CPT | Performed by: DENTIST

## 2024-08-26 LAB — BACTERIA WND CULT: NORMAL

## 2024-08-27 ENCOUNTER — OFFICE VISIT (OUTPATIENT)
Dept: NEUROSURGERY | Facility: CLINIC | Age: 59
End: 2024-08-27
Payer: COMMERCIAL

## 2024-08-27 ENCOUNTER — TELEPHONE (OUTPATIENT)
Dept: NEUROSURGERY | Facility: CLINIC | Age: 59
End: 2024-08-27

## 2024-08-27 DIAGNOSIS — K04.7 DENTAL ABSCESS: ICD-10-CM

## 2024-08-27 DIAGNOSIS — R73.03 PREDIABETES: ICD-10-CM

## 2024-08-27 DIAGNOSIS — I10 ESSENTIAL HYPERTENSION: ICD-10-CM

## 2024-08-27 DIAGNOSIS — Z01.818 PREOP TESTING: Primary | ICD-10-CM

## 2024-08-27 PROCEDURE — 99499 UNLISTED E&M SERVICE: CPT | Mod: ,,,

## 2024-08-27 RX ORDER — CLINDAMYCIN HYDROCHLORIDE 150 MG/1
CAPSULE ORAL
COMMUNITY
End: 2024-10-08

## 2024-08-27 RX ORDER — KETOROLAC TROMETHAMINE 10 MG/1
TABLET, FILM COATED ORAL
COMMUNITY
End: 2024-10-08

## 2024-08-27 RX ORDER — AMOXICILLIN AND CLAVULANATE POTASSIUM 875; 125 MG/1; MG/1
TABLET, FILM COATED ORAL
COMMUNITY
Start: 2024-08-20 | End: 2024-10-08

## 2024-08-27 RX ORDER — OXYCODONE AND ACETAMINOPHEN 10; 325 MG/1; MG/1
TABLET ORAL
COMMUNITY
End: 2024-10-08

## 2024-08-27 RX ORDER — DOXYCYCLINE 100 MG/1
100 CAPSULE ORAL 2 TIMES DAILY
COMMUNITY
Start: 2024-08-26 | End: 2024-10-08

## 2024-08-27 RX ORDER — ACETAMINOPHEN 325 MG/1
325 TABLET ORAL EVERY 6 HOURS PRN
COMMUNITY
End: 2024-10-08

## 2024-08-27 NOTE — TELEPHONE ENCOUNTER
Rae Gudino presented to neurosurgery clinic on 8/27/2024 for right carpal tunnel release evaluation, sign consents and complete preoperative lab and imaging. Due to ongoing dental abscess and care, it was discussed with the patient that she needs to complete her antibiotics and dental care and contact the clinic reschedule her surgery once she is cleared. Patient was agreeable to plan. She will keep her follow ups as scheduled with her dentist and return to clinic once she is recovered from her dental work. When she returns to clinic, surgical consents will be signed at that time as well as preoperative lab work and imaging ordered.

## 2024-08-28 ENCOUNTER — LAB REQUISITION (OUTPATIENT)
Dept: LAB | Facility: HOSPITAL | Age: 59
End: 2024-08-28
Payer: COMMERCIAL

## 2024-08-28 DIAGNOSIS — K04.8 RADICULAR CYST: ICD-10-CM

## 2024-08-28 LAB — BACTERIA SPEC ANAEROBE CULT: ABNORMAL

## 2024-08-28 PROCEDURE — 87070 CULTURE OTHR SPECIMN AEROBIC: CPT | Performed by: RADIOLOGY

## 2024-08-28 PROCEDURE — 87075 CULTR BACTERIA EXCEPT BLOOD: CPT | Performed by: RADIOLOGY

## 2024-08-31 LAB — BACTERIA SPEC ANAEROBE CULT: NORMAL

## 2024-09-01 LAB — BACTERIA WND CULT: ABNORMAL

## 2024-09-20 ENCOUNTER — TELEPHONE (OUTPATIENT)
Dept: NEUROSURGERY | Facility: CLINIC | Age: 59
End: 2024-09-20
Payer: COMMERCIAL

## 2024-10-01 NOTE — TELEPHONE ENCOUNTER
I spoke with the patient.  She is having some new pain in the right arm and hand.  She notices when she tries to squeeze a spray bottle, it will cause pain up the arm to the elbow.  She feels like the right hand is weak, which is not new.  However, her symptoms were worse in the left hand when she was seen in late August.  She was not sure if the new symptoms were related to the carpal tunnel syndrome or not.  She is planning to schedule a follow up with her primary care to see what other possible causes are for her symptoms.  She has completed the antibiotics for her dental abscess, but says something will need to be done with the root canal.  She was on three different antibiotics before the infection cleared and the swelling went down.  Due to all the treatment and procedures for her mouth, she is going to need to postpone the carpal tunnel release a little longer.  I told her I thought it was a good idea to get with her PCP regarding her symptoms.  She said she needs to follow up with her anyway to update her on things regarding the dental abscess.  She was not really interested in physical therapy at the moment, as she has a lot of extra expenses regarding the dental work.  I told her postponing surgery was definitely understandable and to call us when she was ready to move forward and we would be happy to get her in.  She was very appreciative of the phone call.

## 2024-10-04 ENCOUNTER — LAB VISIT (OUTPATIENT)
Dept: LAB | Facility: HOSPITAL | Age: 59
End: 2024-10-04
Attending: FAMILY MEDICINE
Payer: COMMERCIAL

## 2024-10-04 DIAGNOSIS — R79.89 ABNORMAL TSH: ICD-10-CM

## 2024-10-04 LAB
T4 FREE SERPL-MCNC: 0.86 NG/DL (ref 0.7–1.48)
TSH SERPL-ACNC: 0.16 UIU/ML (ref 0.35–4.94)

## 2024-10-04 PROCEDURE — 84443 ASSAY THYROID STIM HORMONE: CPT

## 2024-10-04 PROCEDURE — 84439 ASSAY OF FREE THYROXINE: CPT

## 2024-10-04 PROCEDURE — 36415 COLL VENOUS BLD VENIPUNCTURE: CPT

## 2024-10-08 ENCOUNTER — OFFICE VISIT (OUTPATIENT)
Dept: FAMILY MEDICINE | Facility: CLINIC | Age: 59
End: 2024-10-08
Payer: COMMERCIAL

## 2024-10-08 VITALS
WEIGHT: 210.19 LBS | HEIGHT: 62 IN | OXYGEN SATURATION: 98 % | BODY MASS INDEX: 38.68 KG/M2 | DIASTOLIC BLOOD PRESSURE: 72 MMHG | HEART RATE: 88 BPM | SYSTOLIC BLOOD PRESSURE: 128 MMHG | TEMPERATURE: 99 F

## 2024-10-08 DIAGNOSIS — R29.898 WEAKNESS OF RIGHT ARM: Primary | ICD-10-CM

## 2024-10-08 DIAGNOSIS — R20.0 NUMBNESS OF FINGERS OF BOTH HANDS: ICD-10-CM

## 2024-10-08 PROCEDURE — 3008F BODY MASS INDEX DOCD: CPT | Mod: CPTII,,, | Performed by: FAMILY MEDICINE

## 2024-10-08 PROCEDURE — 1159F MED LIST DOCD IN RCRD: CPT | Mod: CPTII,,, | Performed by: FAMILY MEDICINE

## 2024-10-08 PROCEDURE — 3044F HG A1C LEVEL LT 7.0%: CPT | Mod: CPTII,,, | Performed by: FAMILY MEDICINE

## 2024-10-08 PROCEDURE — G2211 COMPLEX E/M VISIT ADD ON: HCPCS | Mod: ,,, | Performed by: FAMILY MEDICINE

## 2024-10-08 PROCEDURE — 3078F DIAST BP <80 MM HG: CPT | Mod: CPTII,,, | Performed by: FAMILY MEDICINE

## 2024-10-08 PROCEDURE — 1160F RVW MEDS BY RX/DR IN RCRD: CPT | Mod: CPTII,,, | Performed by: FAMILY MEDICINE

## 2024-10-08 PROCEDURE — 99214 OFFICE O/P EST MOD 30 MIN: CPT | Mod: ,,, | Performed by: FAMILY MEDICINE

## 2024-10-08 PROCEDURE — 3074F SYST BP LT 130 MM HG: CPT | Mod: CPTII,,, | Performed by: FAMILY MEDICINE

## 2024-10-09 NOTE — PROGRESS NOTES
Patient ID: [id number]     Chief Complaint:  Right arm weakness    HPI:     Rae Gudino is a 59 y.o. female here today for acute visit :   1) Since last visit patient has had multiple complications due to dental abscess-was scheduled for carpal tunnel surgery surgery was canceled.  For the last 1 month she has been experiencing-increased pain and weakness of her right arm-pain in weakness is starting at the elbow radiating down-she is continuing to have problems with numbness and tingling of both of her hands.  Does not find that gabapentin is helping with the pain.  Does have follow-up appointment with neurosurgeon.       Past Medical History:   Diagnosis Date    Carpal tunnel syndrome, bilateral     Essential hypertension 2022    Gastric reflux 2023    Left arm numbness     Migraine with aura and without status migrainosus, not intractable 2023    Mixed hyperlipidemia 2022    Osteoarthritis     Prediabetes 2022    Seasonal allergies 2022    Sleep apnea     awaiting call for virtual apt for equipment        Past Surgical History:   Procedure Laterality Date    APPENDECTOMY  over 30 years    was in for another surgery and dr decided to remove it     SECTION      COLONOSCOPY      gallstone removal      HYSTERECTOMY      partial    MOUTH SURGERY      PARTIAL HYSTERECTOMY      REPAIR OF MENISCUS OF KNEE Left     TUBAL LIGATION          Social History     Tobacco Use    Smoking status: Never    Smokeless tobacco: Never   Substance Use Topics    Alcohol use: Not Currently     Alcohol/week: 1.0 standard drink of alcohol     Types: 1 Glasses of wine per week     Comment: likely once/twice every few months    Drug use: Never        Social History     Socioeconomic History    Marital status:      Spouse name: Crispin    Number of children: 4        Current Outpatient Medications   Medication Instructions    albuterol (PROVENTIL/VENTOLIN HFA) 90 mcg/actuation inhaler  "2 puff    amLODIPine (NORVASC) 5 mg, Oral, Daily    atorvastatin (LIPITOR) 20 mg, Oral    fluticasone propionate (FLONASE) 50 mcg/actuation nasal spray 1 spray in each nostril    gabapentin (NEURONTIN) 300 mg, Oral, 3 times daily    metFORMIN (GLUCOPHAGE) 500 mg, Oral    montelukast (SINGULAIR) 10 mg tablet 1 tablet    olopatadine-mometasone (RYALTRIS) 665-25 mcg/spray Spry 2 sprays, Nasal, 2 times daily    omeprazole (PRILOSEC) 40 mg, Oral, Every morning    rizatriptan (MAXALT-MLT) 10 MG disintegrating tablet DISSOLVE 1 TABLET ON THE TONGUE EVERY DAY AS NEEDED       Review of patient's allergies indicates:   Allergen Reactions    Penicillins Rash and Other (See Comments)        Patient Care Team:  Sera Allen MD as PCP - General (Family Medicine)  Sb Sanford MD as Consulting Physician (Orthopedic Surgery)  Lyn Alas MD (Neurosurgery)  Darius Winter Jr., MD as Consulting Physician (Otolaryngology)  Don Arana MD as Resident (Hand Surgery)       Subjective:     Review of Systems    12 point review of systems conducted, negative except as stated in the history of present illness. See HPI for details.      Objective:     Visit Vitals  /72   Pulse 88   Temp 98.5 °F (36.9 °C) (Oral)   Ht 5' 2" (1.575 m)   Wt 95.3 kg (210 lb 3.2 oz)   SpO2 98%   BMI 38.45 kg/m²       Physical Exam    General: Alert and oriented, No acute distress.  Head: Normocephalic, Atraumatic.  Eye: Pupils are equal, round and reactive to light, Extraocular movements are intact, Sclera non-icteric.  Ears/Nose/Throat: Normal, Mucosa moist,Clear.  Neck/Thyroid: Supple, Non-tender  Respiratory: Clear to auscultation bilaterally  Cardiovascular: Regular rate and rhythm, S1/S2 normal  Gastrointestinal: Soft, Non-tender, Non-distended, Normal bowel sounds, No palpable organomegaly.  Musculoskeletal: Normal range of motion-symptoms starting at the elbow radiating down-numbness of both hands-muscle strength intact-sensation " intact.  Integumentary: Warm, Dry  Extremities: No clubbing, cyanosis or edema  Neurologic: No focal deficits, Cranial Nerves II-XII are grossly intact  Psychiatric: Normal interaction, Coherent speech       Assessment:       ICD-10-CM ICD-9-CM   1. Weakness of right arm  R29.898 729.89   2. Numbness of fingers of both hands  R20.0 782.0        Plan:     1. Weakness of right arm (Primary)  Pathophysiology/Treatment/ Dangers Discussed.  Difference between cervical radiculopathy and nerve entrapment-discussed with the patient-treatment options discussed-patient will discuss treatment options with neurosurgeon-referral to physical therapy.  Role of anti-inflammatory-muscle relaxers discussed.  - Ambulatory referral/consult to Physical/Occupational Therapy; Future    2. Numbness of fingers of both hands  Pathophysiology/Treatment/ Dangers Discussed.  Patient to discuss treatment options with neurosurgeon.         Follow up if symptoms worsen or fail to improve. In addition to their scheduled follow up, the patient has also been instructed to follow up on as needed basis.     Future Appointments   Date Time Provider Department Center   11/25/2024 11:45 AM Sera Allen MD [facility]   6/24/2025  9:30 AM Darius Winter Jr., MD [facility]        Sera Allen MD

## 2024-10-24 DIAGNOSIS — M47.812 CERVICAL SPONDYLOSIS: ICD-10-CM

## 2024-10-24 DIAGNOSIS — E78.2 MIXED HYPERLIPIDEMIA: ICD-10-CM

## 2024-10-24 DIAGNOSIS — G56.03 BILATERAL CARPAL TUNNEL SYNDROME: ICD-10-CM

## 2024-10-24 DIAGNOSIS — R20.0 BILATERAL HAND NUMBNESS: ICD-10-CM

## 2024-10-24 RX ORDER — ATORVASTATIN CALCIUM 20 MG/1
20 TABLET, FILM COATED ORAL
Qty: 90 TABLET | Refills: 1 | Status: SHIPPED | OUTPATIENT
Start: 2024-10-24

## 2024-10-25 RX ORDER — GABAPENTIN 300 MG/1
300 CAPSULE ORAL 3 TIMES DAILY
Qty: 90 CAPSULE | Refills: 1 | Status: SHIPPED | OUTPATIENT
Start: 2024-10-25

## 2024-10-25 NOTE — TELEPHONE ENCOUNTER
Gabapentin refills signed.     Orders Placed This Encounter    gabapentin (NEURONTIN) 300 MG capsule      Patient pending right carpal tunnel release surgery.

## 2024-11-18 NOTE — PROGRESS NOTES
Patient ID: [id number]     Chief Complaint:  Hypertension    HPI:     Rae Gudino is a 59 y.o. female here today for  follow up:   Patient did complete labs prior to visit-upon reviewing labs-it was noted that labs were completed for abnormal TSH-not six-month follow-up-patient will wait to complete wellness labs in 6 months.   Is continuing to have significant problems with pain and weakness of her arms-was evaluated by neurosurgeon-has been going to physical therapy with no significant improvement in symptoms.  Concerns discussed.  1) Hypertension: Patient has been taking medicine daily. BP is normal at home. No symptoms associated with increased BP such as headache/ visual changes/ dizziness/ chest pain.   2) Hyperlipidemia: Patient is taking medication at Night. No side effects of medication noted.  3) Prediabetic: Patient is taking metformin in order to help prevent diabetes-no side effects of medicine noticeable  4)  Reflux: Patient is continuing to take medication-intermittently does have problems with reflux related symptoms including epigastric discomfort-burning sensation.    Past Medical History:   Diagnosis Date    Carpal tunnel syndrome, bilateral     Essential hypertension 2022    Gastric reflux 2023    Left arm numbness     Migraine with aura and without status migrainosus, not intractable 2023    Mixed hyperlipidemia 2022    Prediabetes 2022    Seasonal allergies 2022        Past Surgical History:   Procedure Laterality Date    APPENDECTOMY  over 30 years    was in for another surgery and dr decided to remove it     SECTION      COLONOSCOPY      gallstone removal      HYSTERECTOMY      partial    MOUTH SURGERY      PARTIAL HYSTERECTOMY      REPAIR OF MENISCUS OF KNEE Left     TUBAL LIGATION          Social History     Tobacco Use    Smoking status: Never    Smokeless tobacco: Never   Substance Use Topics    Alcohol use: Not Currently      "Alcohol/week: 1.0 standard drink of alcohol     Types: 1 Glasses of wine per week     Comment: likely once/twice every few months    Drug use: Never        Social History     Socioeconomic History    Marital status:      Spouse name: Crispin    Number of children: 4        Current Outpatient Medications   Medication Instructions    albuterol (PROVENTIL/VENTOLIN HFA) 90 mcg/actuation inhaler 2 puff    amLODIPine (NORVASC) 5 mg, Oral, Daily    atorvastatin (LIPITOR) 20 mg, Oral    famotidine (PEPCID) 40 mg, Oral, Daily    fluticasone propionate (FLONASE) 50 mcg/actuation nasal spray 1 spray in each nostril    gabapentin (NEURONTIN) 300 mg, Oral, 3 times daily    metFORMIN (GLUCOPHAGE) 500 mg, Oral    montelukast (SINGULAIR) 10 mg tablet 1 tablet    olopatadine-mometasone (RYALTRIS) 665-25 mcg/spray Spry 2 sprays, Nasal, 2 times daily    omeprazole (PRILOSEC) 40 mg, Oral, Every morning    rizatriptan (MAXALT-MLT) 10 MG disintegrating tablet DISSOLVE 1 TABLET ON THE TONGUE EVERY DAY AS NEEDED       Review of patient's allergies indicates:   Allergen Reactions    Penicillins Rash and Other (See Comments)        Patient Care Team:  Sera Allen MD as PCP - General (Family Medicine)  Sb Sanford MD as Consulting Physician (Orthopedic Surgery)  Lyn Alas MD (Neurosurgery)  Darius Winter Jr., MD as Consulting Physician (Otolaryngology)  Don Arana MD (Inactive) as Resident (Hand Surgery)       Subjective:     Review of Systems    12 point review of systems conducted, negative except as stated in the history of present illness. See HPI for details.      Objective:     Visit Vitals  /77 (BP Location: Left arm, Patient Position: Sitting)   Pulse 69   Resp 20   Ht 5' 2" (1.575 m)   Wt 95.6 kg (210 lb 12.8 oz)   SpO2 96%   BMI 38.56 kg/m²       Physical Exam    General: Alert and oriented, No acute distress.  Head: Normocephalic, Atraumatic.  Eye: Pupils are equal, round and reactive to light, " Extraocular movements are intact, Sclera non-icteric.  Ears/Nose/Throat: Normal, Mucosa moist,Clear.  Neck/Thyroid: Supple, Non-tender  Respiratory: Clear to auscultation bilaterally  Cardiovascular: Regular rate and rhythm, S1/S2 normal  Gastrointestinal: Soft, Non-tender, Non-distended, Normal bowel sounds, No palpable organomegaly.  Musculoskeletal:  No acute changes since last visit.  Integumentary: Warm, Dry  Extremities: No clubbing, cyanosis or edema  Neurologic: No focal deficits, Cranial Nerves II-XII are grossly intact  Psychiatric: Normal interaction, Coherent speech       Assessment:       ICD-10-CM ICD-9-CM   1. Abnormal TSH  R79.89 790.6   2. Gastric reflux  K21.9 530.81   3. Essential hypertension  I10 401.9   4. Mixed hyperlipidemia  E78.2 272.2   5. Prediabetes  R73.03 790.29   6. Migraine with aura and without status migrainosus, not intractable  G43.109 346.00   7. Wellness examination  Z00.00 V70.0        Plan:      1. Abnormal TSH (Primary)  Pathophysiology/Treatment/ Dangers Discussed.  Continuing abnormality of TSH-check ultrasound management based on finding.  - US Soft Tissue Head Neck; Future    2. Gastric reflux  Pathophysiology/Treatment/ Dangers Discussed.  Rx Pepcid -patient to call office if worsening of symptoms or no improvement-may need GI evaluation.  - famotidine (PEPCID) 40 MG tablet; Take 1 tablet (40 mg total) by mouth once daily.  Dispense: 90 tablet; Refill: 3    3. Essential hypertension  Patient has been taking medication-Norvasc 5 mg. Blood pressure goal of less than 130/80-blood pressure is stable. Continue to encourage diet and activity modifications. Including stress management. Pathophysiology/treatment/dangers discussed.      4. Mixed hyperlipidemia  Lab Results   Component Value Date    CHOL 170 05/03/2024    LDL 99.00 05/03/2024    TRIG 167 (H) 05/03/2024    HDL 38 05/03/2024     Pathophysiology/treatment/dangers discussed. Patient to continue diet  modification-Lipitor 20 mg-Co Q10 200 mg at night.   Total cholesterol was 170 ( Goal less than 200) HDL 38 ( Goal high as possible) LDL 99 ( Goal less than 100) Triglycerides 167 ( Goal less than 150)    5. Prediabetes  Hemoglobin A1c was  .  Lab Results   Component Value Date    HGBA1C 5.6 05/03/2024     Normal less than 5.7 - Diagnosis of Type 2 Diabetes Mellitus at 6.5. Encourage diet and activity modification Pathophysiology/treatment/dangers discussed.    6. Migraine with aura and without status migrainosus, not intractable  Patient is currently stable.  No acute modifications recommended.  Continue with current treatment.    7. Wellness examination  Patient given lab orders to be completed before wellness visit.   - CBC Auto Differential; Future  - Comprehensive Metabolic Panel; Future  - Lipid Panel; Future  - TSH; Future  - Hemoglobin A1C; Future  - Urinalysis, Reflex to Urine Culture; Future      Follow up in about 6 months (around 5/25/2025) for Annual Wellness. In addition to their scheduled follow up, the patient has also been instructed to follow up on as needed basis.     Future Appointments   Date Time Provider Department Center   12/20/2024  1:00 PM [facility]   5/30/2025 10:45 AM Sera Allen MD [facility] ANIYA [facility]   6/24/2025  9:30 AM Darius Winter Jr., MD [facility] [address] [facility]        Sera Allen MD

## 2024-11-25 ENCOUNTER — OFFICE VISIT (OUTPATIENT)
Dept: FAMILY MEDICINE | Facility: CLINIC | Age: 59
End: 2024-11-25
Payer: COMMERCIAL

## 2024-11-25 VITALS
OXYGEN SATURATION: 96 % | HEIGHT: 62 IN | RESPIRATION RATE: 20 BRPM | WEIGHT: 210.81 LBS | BODY MASS INDEX: 38.79 KG/M2 | SYSTOLIC BLOOD PRESSURE: 120 MMHG | DIASTOLIC BLOOD PRESSURE: 77 MMHG | HEART RATE: 69 BPM

## 2024-11-25 DIAGNOSIS — R73.03 PREDIABETES: ICD-10-CM

## 2024-11-25 DIAGNOSIS — E78.2 MIXED HYPERLIPIDEMIA: ICD-10-CM

## 2024-11-25 DIAGNOSIS — R79.89 ABNORMAL TSH: Primary | ICD-10-CM

## 2024-11-25 DIAGNOSIS — Z00.00 WELLNESS EXAMINATION: ICD-10-CM

## 2024-11-25 DIAGNOSIS — G43.109 MIGRAINE WITH AURA AND WITHOUT STATUS MIGRAINOSUS, NOT INTRACTABLE: ICD-10-CM

## 2024-11-25 DIAGNOSIS — I10 ESSENTIAL HYPERTENSION: ICD-10-CM

## 2024-11-25 DIAGNOSIS — K21.9 GASTRIC REFLUX: ICD-10-CM

## 2024-11-25 PROCEDURE — 3074F SYST BP LT 130 MM HG: CPT | Mod: CPTII,,, | Performed by: FAMILY MEDICINE

## 2024-11-25 PROCEDURE — 99214 OFFICE O/P EST MOD 30 MIN: CPT | Mod: ,,, | Performed by: FAMILY MEDICINE

## 2024-11-25 PROCEDURE — 3008F BODY MASS INDEX DOCD: CPT | Mod: CPTII,,, | Performed by: FAMILY MEDICINE

## 2024-11-25 PROCEDURE — G2211 COMPLEX E/M VISIT ADD ON: HCPCS | Mod: ,,, | Performed by: FAMILY MEDICINE

## 2024-11-25 PROCEDURE — 3044F HG A1C LEVEL LT 7.0%: CPT | Mod: CPTII,,, | Performed by: FAMILY MEDICINE

## 2024-11-25 PROCEDURE — 1159F MED LIST DOCD IN RCRD: CPT | Mod: CPTII,,, | Performed by: FAMILY MEDICINE

## 2024-11-25 PROCEDURE — 3078F DIAST BP <80 MM HG: CPT | Mod: CPTII,,, | Performed by: FAMILY MEDICINE

## 2024-11-25 PROCEDURE — 1160F RVW MEDS BY RX/DR IN RCRD: CPT | Mod: CPTII,,, | Performed by: FAMILY MEDICINE

## 2024-11-25 RX ORDER — FAMOTIDINE 40 MG/1
40 TABLET, FILM COATED ORAL DAILY
Qty: 90 TABLET | Refills: 3 | Status: SHIPPED | OUTPATIENT
Start: 2024-11-25 | End: 2025-11-25

## 2024-12-03 ENCOUNTER — HOSPITAL ENCOUNTER (OUTPATIENT)
Dept: RADIOLOGY | Facility: HOSPITAL | Age: 59
Discharge: HOME | End: 2024-12-03
Attending: FAMILY MEDICINE
Payer: COMMERCIAL

## 2024-12-03 ENCOUNTER — TELEPHONE (OUTPATIENT)
Dept: NEUROSURGERY | Facility: CLINIC | Age: 59
End: 2024-12-03
Payer: COMMERCIAL

## 2024-12-03 DIAGNOSIS — R79.89 ABNORMAL TSH: ICD-10-CM

## 2024-12-03 PROCEDURE — 76536 US EXAM OF HEAD AND NECK: CPT | Mod: TC

## 2024-12-04 NOTE — TELEPHONE ENCOUNTER
Please schedule the patient appointment with Dr. Alas to discuss moving forward with surgical intervention.  Thank you

## 2024-12-04 NOTE — TELEPHONE ENCOUNTER
I spoke with the patient to get her scheduled with Dr. Alas to re-discuss sx. She states she is not too sure what is going on now, she is having more pain in her arm now. I scheduled her with Dr. Alas for 12/5/24 at 9:30. She was compliant w date and time.

## 2024-12-05 ENCOUNTER — TELEPHONE (OUTPATIENT)
Dept: FAMILY MEDICINE | Facility: CLINIC | Age: 59
End: 2024-12-05
Payer: COMMERCIAL

## 2024-12-05 ENCOUNTER — OFFICE VISIT (OUTPATIENT)
Dept: NEUROSURGERY | Facility: CLINIC | Age: 59
End: 2024-12-05
Payer: COMMERCIAL

## 2024-12-05 VITALS
BODY MASS INDEX: 38.79 KG/M2 | RESPIRATION RATE: 16 BRPM | SYSTOLIC BLOOD PRESSURE: 111 MMHG | DIASTOLIC BLOOD PRESSURE: 76 MMHG | HEART RATE: 66 BPM | WEIGHT: 210.81 LBS | HEIGHT: 62 IN

## 2024-12-05 DIAGNOSIS — I10 ESSENTIAL HYPERTENSION: ICD-10-CM

## 2024-12-05 DIAGNOSIS — M79.601 RIGHT ARM PAIN: ICD-10-CM

## 2024-12-05 DIAGNOSIS — Z01.818 PREOPERATIVE TESTING: ICD-10-CM

## 2024-12-05 DIAGNOSIS — R73.03 PREDIABETES: ICD-10-CM

## 2024-12-05 DIAGNOSIS — G56.03 BILATERAL CARPAL TUNNEL SYNDROME: Primary | ICD-10-CM

## 2024-12-05 PROCEDURE — 3074F SYST BP LT 130 MM HG: CPT | Mod: CPTII,,, | Performed by: NEUROLOGICAL SURGERY

## 2024-12-05 PROCEDURE — 1160F RVW MEDS BY RX/DR IN RCRD: CPT | Mod: CPTII,,, | Performed by: NEUROLOGICAL SURGERY

## 2024-12-05 PROCEDURE — 99214 OFFICE O/P EST MOD 30 MIN: CPT | Mod: ,,, | Performed by: NEUROLOGICAL SURGERY

## 2024-12-05 PROCEDURE — 1159F MED LIST DOCD IN RCRD: CPT | Mod: CPTII,,, | Performed by: NEUROLOGICAL SURGERY

## 2024-12-05 PROCEDURE — 3078F DIAST BP <80 MM HG: CPT | Mod: CPTII,,, | Performed by: NEUROLOGICAL SURGERY

## 2024-12-05 PROCEDURE — 3008F BODY MASS INDEX DOCD: CPT | Mod: CPTII,,, | Performed by: NEUROLOGICAL SURGERY

## 2024-12-05 PROCEDURE — 3044F HG A1C LEVEL LT 7.0%: CPT | Mod: CPTII,,, | Performed by: NEUROLOGICAL SURGERY

## 2024-12-05 NOTE — PATIENT INSTRUCTIONS
Ms. Gudino is a 59 y.o. right-handed female who has a past medical history significant for hypertension and migraine headache.  She presents as a follow up patient in the neurosurgery clinic for significant pain in her right arm distal to her elbow into all her fingers for the last 2 months.  She continues to have chronic numbness in her bilateral hands and fingers for approximately 1 1/2 year.  The patient has a normal motor and sensory neurological exam.  There are no signs of myelopathy.     I reviewed pertinent imaging studies with the patient.  An EMG/ NCV of the bilateral upper extremities demonstrates moderate bilateral carpal tunnel syndrome.  There is no evidence of cervical radiculopathy.      PLAN:    Encounter Diagnoses   Name Primary?    Bilateral carpal tunnel syndrome Yes    Right arm pain     Essential hypertension     Prediabetes     Preoperative testing      Orders Placed This Encounter   Procedures    X-Ray Chest PA And Lateral    Protime-INR    APTT    CBC Auto Differential    Comprehensive Metabolic Panel    Hemoglobin A1C    Urinalysis    EKG 12-lead        1.  Because the patient has been experiencing progressively worsening distal right arm pain as well as bilateral hand numbness even while optimizing medical management, she is therefore a candidate for surgery, specifically a right open carpal tunnel release, as she is right-hand dominant with most of her pain on the right.  I reviewed the risks, benefits, and alternatives of this surgery.  She agrees to proceed.  All questions were answered to her satisfaction.       2.  Ms. Gudino's right open carpal tunnel release surgery is being scheduled for Friday, 12/27/2024 at 7:30 AM.  She will be a candidate for a left open carpal tunnel release approximately 6 weeks later, with a tentative date of Monday, 02/17/2025 at 7:30 a.m.    3.  In the meantime, she is encouraged to continue with physical therapy sessions at the [facility] facility in [facility]  [place].     4.  The patient's BMI is elevated at 38.56.  We discussed weight loss goals with diet and exercise.       5.  At the end of her visit, Ms. Gudino was further evaluated and signed surgical consent forms with FREDY Cr.  She will be completing preoperative tests and lab work.           The risks, benefits, and alternatives to surgery were discussed with the patient.      Complications of an Open Carpal Tunnel Release may include:  Failure to improve symptoms and/or increased or persistent pain; Recurrence, continuation, or worsening of the condition that required the operation; Need for further surgical intervention or treatment; Neurological injury, which may result in loss of hand function, hand weakness and numbness; Damage to blood vessels, nerves, tissues, and surrounding anatomical structures; and Scarring.     Complications of any surgery may include:  Adverse reaction to anesthesia; Bleeding; Transfusion of blood products, which carries a risk of infection or reaction; Infection, which requires treatment with antibiotics by mouth or intravenously, or even further surgeries; Urinary tract infection; Heart attack, stroke, pneumonia, and DVT/PE (blood clot in the legs or pelvis that can dislodge and go to the lungs); Other unforeseen complications; Coma; and Death.     Benefits of surgery include:  Possible improvement in pain, numbness, and/or weakness; and possible improvement in  hand function.        Alternatives to the procedure include:  Physical therapy, splints, injections, and medical therapy.        CARPAL TUNNEL RELEASE/ ULNAR NERVE RELEASE  DISCHARGE INSTRUCTIONS        1.   Keep your incision clean and dry.    Wait at least 72 hours from the time of your surgery to take a shower.  3 days after your surgery, unwrap the dressings completely and then rewrap with clean gauze and an ACE bandage.  Rewrap around a hand splint if you had a carpal tunnel release.  Do this on a daily basis until  your follow-up appointment.   Your sutures will be removed at your first postoperative follow-up appointment in approximately 14 days.   Place a bag over your arm when you take a shower.  Do not immerse your incision in water for 4 weeks (e.g. bath tub, hot tub, swimming pool).        2.  Activity restrictions:  If you had an ulnar nerve release, wear your arm sling whenever you are out of bed.  Try to minimize the use of your surgical hand until you are evaluated at your first postoperative appointment.  No impact exercise or contact sports for at least 4 weeks  No driving for 1 week.  To resume driving short distances (<30 minutes), you must be off of your narcotic medications and be able to comfortably handle the steering wheel suddenly, should the need arise.    Get up and walk.        3.  Contact your Neurosurgeon if the following occurs:  Signs and symptoms of infection, including fever above 101.5 degrees Fahrenheit and/or chills.  Redness, swelling, warmth, or drainage from the incision.  Any lasting changes in sensation, numbness, and/or tingling.  Increased weakness or increased pain.     Office hours Monday through Friday, 8:00 AM to 4:00 PM except for holidays. There is an answering service available during non-office hours, with 24 hours neurosurgery coverage.  Report to the Emergency Department if you need immediate medical assistance.       Please contact Dr. Alas's office for any questions or concerns.  Typically, your first follow-up appointment is approximately 14 days from the date of your operation.   At this time, sutures will be removed.       This note will be sent to the patient's referring provider No ref. provider found and primary care provider Sera Allen MD.

## 2024-12-05 NOTE — TELEPHONE ENCOUNTER
----- Message from Sera Allen MD sent at 12/5/2024 11:28 AM CST -----  Patient called results discussed. Fax copy to Dr. Winter

## 2024-12-05 NOTE — PROGRESS NOTES
[facility]   Neurosurgery      Raemonserrat Gudino  MRN: [id number], CSN: [id number]      : 1965   Age: 59 y.o. female  Payor: [facility] / Plan: BCBS ALL OUT OF STATE / Product Type: PPO /       Ref:  No referring provider defined for this encounter.    PCP: Sera Allen MD    Visit Date: 2024     Patient Active Problem List   Diagnosis    Essential hypertension    Mixed hyperlipidemia    Prediabetes    Seasonal allergies    Migraine with aura and without status migrainosus, not intractable    Left thyroid nodule    Gastric reflux    GAETANO (obstructive sleep apnea)       SUBJECTIVE:      CC:   Chief Complaint   Patient presents with    worsening R distal arm pain with b hand numbness       HPI:   Ms. Gudino is a 59 y.o. right-handed female who has a past medical history significant for hypertension and migraine headache.  She presents as a follow up patient in the neurosurgery clinic for significant pain in her right arm distal to her elbow into all her fingers for the last 2 months.  She continues to have chronic numbness in her bilateral hands and fingers for approximately 1 1/2 year.      The patient's last date of visit in my neurosurgery clinic was on 2024, at which time she was scheduled for a right open carpal tunnel release surgery on Monday, 2024.  The plan included proceeding with a left open carpal tunnel release approximately 6 weeks later.  In addition, weight loss goals were discussed.    Ms. Gudino was then evaluated in the neurosurgery clinic by FREDY Cr for a preoperative appointment.  Her surgery was canceled due to of a dental abscess with antibiotics.  She has now completed antibiotics as of 2024 and would like to scheduled for another surgery date.  Since her last visit, there have been no significant changes in her weight.    The patient states that in the last 2 months, she has been experiencing more pain in her right forearm  into all of her right fingers.  There is constant 8/10 pain that is associated with right hand weakness.  There is continued chronic numbness in her left hand and fingers.  These numbness symptoms make it very difficult to drive.  Ms. Gudino does not have any pain along her spinal axis.  The patient has been fully ambulatory with no bowel or bladder incontinence.    She has tried both Neurontin and Lyrica without significant improvement.  The patient is currently participating in a physical therapy program at the [facility] facility in [place], [place] with minimal improvement in her  strength.      Reviewed from the patient's last date of visit on 07/25/2024:  The patient completed a physical therapy program at [facility] on [address].  She continues to do exercises at home.  Her neck and low back pain symptoms have generally improved.  There have been minimal varying weight fluctuations.   However, since her last visit,  Ms. Gudino has been experiencing worsening of her left-greater-than-right hand numbness symptoms.  Dr. Arana is no longer practicing in the local area.  An EMG/NCV of her bilateral upper extremities was completed recently, which confirmed moderate bilateral carpal tunnel syndrome.      The numbness in her left-greater-than-right right forearms, hands, and fingers occurs upon awakening.  Driving also seems to worsen her symptoms.  The patient has to shake her bilateral arms to lessen her numbness symptoms, which Ms. Gudino does repeatedly throughout her evaluation.  She has been compliant in wearing bilateral wrist splints without significant improvement.  The patient does not have any radiating pain from her spinal axis into any of her extremities.  There has been no focal weakness or gait instability.  The patient does not have any bowel or bladder incontinence.         Reviewed from the patient's last date of visit on 10/26/2023:  The patient has mild, chronic intermittent pain neck and lower back, with  "a current pain level of 1/10.  She sometimes has a sensation of a "knot" in the upper aspect of neck that has pain when she presses in this area.   Low back pain is intermittent and mild.  Ms. Gudino does not have any radiating pain into her extremities and does not have any focal weakness.  She has constant numbness in her left-greater-than-right forearms and hands involving all of her fingers.  The patient does not have any wrist pain.  Her numbness symptoms are most noticeable when waking up from sleep, when her hands are also swollen.  She occasionally drops objects when holding objects.  The patient has been fully ambulatory with no bowel or bladder incontinence.      Patient Active Problem List    Diagnosis Date Noted    GAETANO (obstructive sleep apnea) 2024    Gastric reflux 2023    Left thyroid nodule 2023    Migraine with aura and without status migrainosus, not intractable 2023    Essential hypertension 2022    Mixed hyperlipidemia 2022    Prediabetes 2022    Seasonal allergies 2022     Past Medical History:   Diagnosis Date    Carpal tunnel syndrome, bilateral     Essential hypertension 2022    Gastric reflux 2023    Left arm numbness     Migraine with aura and without status migrainosus, not intractable 2023    Mixed hyperlipidemia 2022    Prediabetes 2022    Seasonal allergies 2022    Sleep apnea, unspecified      Past Surgical History:   Procedure Laterality Date    APPENDECTOMY  over 30 years    was in for another surgery and dr decided to remove it     SECTION      COLONOSCOPY      gallstone removal      HYSTERECTOMY      partial    MOUTH SURGERY      PARTIAL HYSTERECTOMY      REPAIR OF MENISCUS OF KNEE Left     TUBAL LIGATION         Current Outpatient Medications:     albuterol (PROVENTIL/VENTOLIN HFA) 90 mcg/actuation inhaler, 2 puff, Disp: 18 g, Rfl: 3    amLODIPine (NORVASC) 5 MG tablet, Take 1 tablet (5 " mg total) by mouth once daily., Disp: 90 tablet, Rfl: 3    atorvastatin (LIPITOR) 20 MG tablet, TAKE 1 TABLET BY MOUTH EVERY DAY, Disp: 90 tablet, Rfl: 1    famotidine (PEPCID) 40 MG tablet, Take 1 tablet (40 mg total) by mouth once daily., Disp: 90 tablet, Rfl: 3    fluticasone propionate (FLONASE) 50 mcg/actuation nasal spray, 1 spray in each nostril, Disp: 16 g, Rfl: 3    metFORMIN (GLUCOPHAGE) 500 MG tablet, TAKE 1 TABLET BY MOUTH EVERY DAY WITH A MEAL, Disp: 90 tablet, Rfl: 1    montelukast (SINGULAIR) 10 mg tablet, 1 tablet, Disp: 90 tablet, Rfl: 3    olopatadine-mometasone (RYALTRIS) 665-25 mcg/spray Spry, 2 sprays by Nasal route 2 (two) times daily., Disp: 29 g, Rfl: 0    omeprazole (PRILOSEC) 40 MG capsule, Take 1 capsule (40 mg total) by mouth every morning., Disp: 90 capsule, Rfl: 3    rizatriptan (MAXALT-MLT) 10 MG disintegrating tablet, DISSOLVE 1 TABLET ON THE TONGUE EVERY DAY AS NEEDED, Disp: 12 tablet, Rfl: 1    Review of patient's allergies indicates:   Allergen Reactions    Penicillins Rash and Other (See Comments)       Social History     Tobacco Use    Smoking status: Never    Smokeless tobacco: Never   Substance Use Topics    Alcohol use: Not Currently     Alcohol/week: 1.0 standard drink of alcohol     Types: 1 Glasses of wine per week     Comment: likely once/twice every few months     Occupation: Retired in 2022, previously worked as a  in the [employer]     Family History   Problem Relation Name Age of Onset    Diabetes Mother Marilyn Wiseman     Rheum arthritis Mother Marilyn Wiseman     Arthritis Mother Marilyn Wiseman     Hypertension Mother Marilyn Wiseman     Miscarriages / Stillbirths Mother Marilyn Wiseman     Vision loss Mother Marilyn Wiseman         due to diabetes    Prostate cancer Father  60    Multiple sclerosis Sister      Hypertension Maternal Grandmother Zainab Julius     Stroke Maternal Grandmother Zainab Julius   "      ROS:  Constitutional:  Negative for chills and fever.   HENT:  Positive for sore throat, Negative for congestion.  Eyes:  Negative for blurred vision and double vision.   Respiratory:  Positive for cough, Negative for shortness of breath.  Cardiovascular:  Negative for chest pain and palpitations.   Gastrointestinal:  Negative for constipation, diarrhea, nausea and vomiting.   Musculoskeletal:  Negative for neck pain and back pain  Neurological:  Positive for sensory change and focal weakness, Negative for headaches.   Endo/Heme/Allergies:  Does not bruise/bleed easily.   Psychiatric/Behavioral:  Negative for depression and anxiety.      OBJECTIVE:     EXAMINATION:  /76 (BP Location: Left arm, Patient Position: Sitting)   Pulse 66   Resp 16   Ht 5' 2" (1.575 m)   Wt 95.6 kg (210 lb 12.8 oz)   BMI 38.56 kg/m²   Body Habitus: Significantly Obese    Physical Exam:  Constitutional: The patient is well-developed and cooperative, sitting comfortably in a chair.      Mental Status:   Oriented to person, place, and time  Normal speech      Motor:  Muscle bulk: normal in all extremities  Tone: normal in all extremities     Upper extremities:  Deltoid: right 5/5; left 5/5  Biceps: right 5/5; left 5/5  Triceps: right 5/5; left 5/5  Wrist extensors: right 5/5; left 5/5  Wrist flexors: right 5/5; left 5/5  : right 5/5; left 5/5  Interosseous muscles: right 5/5; left 5/5     Lower extremities:  Hip flexors: right 5/5; left 5/5  Knee extensors: right 5/5; left 5/5  Knee flexors: right 5/5; left 5/5  Foot dorsiflexors: right 5/5; left 5/5  Foot plantar flexors: right 5/5; left 5/5  Extensor hallucis longus: right 5/5; left 5/5     Sensation:  Normal sensation to light touch x 4 extremities     Peripheral nerve neuro exam:  Normal Tinnel's bilaterally  Normal Phallen's bilaterally  Elbow pain with tapping: right positive; left negative        Reflexes:  Ruizs sign: right negative; left negative  Babinski: " right downgoing; left downgoing  Clonus: right negative; left negative        Musculoskeletal:     Gait: normal      Cervical: No pain with palpation  Upper back: No pain with palpation  Lower back: No pain with palpation        DIAGNOSTICS REVIEW OF IMAGING, LAB & OTHER STUDIES:  I have personally reviewed and evaluated the following reports as well as radiographic studies:     EMG/ NCV bilateral upper extremities, 07/18/2024, Dr. Duke- moderate bilateral carpal tunnel syndrome.  There is no evidence of cervical radiculopathy.     EMG/NCV bilateral upper extremities, 06/14/2023, Dr. Addison- bilateral moderate carpal tunnel syndrome, chronic left C7 radiculopathy.         Cervical spine x-rays, 06/12/2023- there is normal alignment with degenerative disc disease at C3-4 and C4-5.     MRI cervical spine without gadolinium, 06/15/2023- there is mild kyphosis with otherwise normal alignment.  There are disc osteophyte complexes at C3-4 and C4-5 with mild-to-moderate bilateral neuroforaminal narrowing.  There is no significant central stenosis.         ASSESSMENT:  Ms. Gudino is a 59 y.o. right-handed female who has a past medical history significant for hypertension and migraine headache.  She presents as a follow up patient in the neurosurgery clinic for significant pain in her right arm distal to her elbow into all her fingers for the last 2 months.  She continues to have chronic numbness in her bilateral hands and fingers for approximately 1 1/2 year.  The patient has a normal motor and sensory neurological exam.  There are no signs of myelopathy.     I reviewed pertinent imaging studies with the patient.  An EMG/ NCV of the bilateral upper extremities demonstrates moderate bilateral carpal tunnel syndrome.  There is no evidence of cervical radiculopathy.      PLAN:    Encounter Diagnoses   Name Primary?    Bilateral carpal tunnel syndrome Yes    Right arm pain     Essential hypertension     Prediabetes      Preoperative testing      Orders Placed This Encounter   Procedures    X-Ray Chest PA And Lateral    Protime-INR    APTT    CBC Auto Differential    Comprehensive Metabolic Panel    Hemoglobin A1C    Urinalysis    EKG 12-lead        1.  Because the patient has been experiencing progressively worsening distal right arm pain as well as bilateral hand numbness even while optimizing medical management, she is therefore a candidate for surgery, specifically a right open carpal tunnel release, as she is right-hand dominant with most of her pain on the right.  I reviewed the risks, benefits, and alternatives of this surgery.  She agrees to proceed.  All questions were answered to her satisfaction.       2.  Ms. Gudino's right open carpal tunnel release surgery is being scheduled for Friday, 12/27/2024 at 7:30 AM.  She will be a candidate for a left open carpal tunnel release approximately 6 weeks later, with a tentative date of Monday, 02/17/2025 at 7:30 a.m.    3.  In the meantime, she is encouraged to continue with physical therapy sessions at the [facility] facility in [place], [place].     4.  The patient's BMI is elevated at 38.56.  We discussed weight loss goals with diet and exercise.       5.  At the end of her visit, Ms. Gudino was further evaluated and signed surgical consent forms with FREDY Cr.  She will be completing preoperative tests and lab work.           The risks, benefits, and alternatives to surgery were discussed with the patient.      Complications of an Open Carpal Tunnel Release may include:  Failure to improve symptoms and/or increased or persistent pain; Recurrence, continuation, or worsening of the condition that required the operation; Need for further surgical intervention or treatment; Neurological injury, which may result in loss of hand function, hand weakness and numbness; Damage to blood vessels, nerves, tissues, and surrounding anatomical structures; and Scarring.     Complications of any  surgery may include:  Adverse reaction to anesthesia; Bleeding; Transfusion of blood products, which carries a risk of infection or reaction; Infection, which requires treatment with antibiotics by mouth or intravenously, or even further surgeries; Urinary tract infection; Heart attack, stroke, pneumonia, and DVT/PE (blood clot in the legs or pelvis that can dislodge and go to the lungs); Other unforeseen complications; Coma; and Death.     Benefits of surgery include:  Possible improvement in pain, numbness, and/or weakness; and possible improvement in  hand function.        Alternatives to the procedure include:  Physical therapy, splints, injections, and medical therapy.        CARPAL TUNNEL RELEASE/ ULNAR NERVE RELEASE  DISCHARGE INSTRUCTIONS        1.   Keep your incision clean and dry.    Wait at least 72 hours from the time of your surgery to take a shower.  3 days after your surgery, unwrap the dressings completely and then rewrap with clean gauze and an ACE bandage.  Rewrap around a hand splint if you had a carpal tunnel release.  Do this on a daily basis until your follow-up appointment.   Your sutures will be removed at your first postoperative follow-up appointment in approximately 14 days.   Place a bag over your arm when you take a shower.  Do not immerse your incision in water for 4 weeks (e.g. bath tub, hot tub, swimming pool).        2.  Activity restrictions:  If you had an ulnar nerve release, wear your arm sling whenever you are out of bed.  Try to minimize the use of your surgical hand until you are evaluated at your first postoperative appointment.  No impact exercise or contact sports for at least 4 weeks  No driving for 1 week.  To resume driving short distances (<30 minutes), you must be off of your narcotic medications and be able to comfortably handle the steering wheel suddenly, should the need arise.    Get up and walk.        3.  Contact your Neurosurgeon if the following occurs:  Signs  and symptoms of infection, including fever above 101.5 degrees Fahrenheit and/or chills.  Redness, swelling, warmth, or drainage from the incision.  Any lasting changes in sensation, numbness, and/or tingling.  Increased weakness or increased pain.     Office hours Monday through Friday, 8:00 AM to 4:00 PM except for holidays. There is an answering service available during non-office hours, with 24 hours neurosurgery coverage.  Report to the Emergency Department if you need immediate medical assistance.       Please contact Dr. Alas's office for any questions or concerns.  Typically, your first follow-up appointment is approximately 14 days from the date of your operation.   At this time, sutures will be removed.       This note will be sent to the patient's referring provider No ref. provider found and primary care provider Sera Allen MD.           Lyn Alas MD  Neurosurgeon    Addendum:  I was able to speak with Ms. Gudino regarding her upcoming surgery and answer any further questions she had. After reviewing consents and answering any further questions he agreed to proceed with surgery and sign consents. Pre-op examination and assessment was completed and pre-op labs and imaging were ordered.     Cardiovascular:  Neck is supple  No carotid bruits  Regular rate and rhythm    Lungs:  Breath sounds clear, non-labored    Abdomen:  Soft, non-tender, non-distended     AUREA Medina  Neurosurgery

## 2024-12-10 ENCOUNTER — TELEPHONE (OUTPATIENT)
Dept: SURGICAL ONCOLOGY | Facility: CLINIC | Age: 59
End: 2024-12-10
Payer: COMMERCIAL

## 2024-12-12 ENCOUNTER — TELEPHONE (OUTPATIENT)
Dept: NEUROSURGERY | Facility: CLINIC | Age: 59
End: 2024-12-12

## 2024-12-12 DIAGNOSIS — G56.03 BILATERAL CARPAL TUNNEL SYNDROME: Primary | ICD-10-CM

## 2024-12-12 NOTE — TELEPHONE ENCOUNTER
I spoke with the patient regarding changes in the surgery schedule.  I offered a new date of 1/13/25.  She is not able to do surgery after the first of the year due to her deductible.  She would like to see if someone at [facility] can do the surgery before the end of the year.  She was seen by Dr. Arana in the past, but is aware that he is no longer there.  However, she may be able to get back in quicker since she has been seen there before.  I told her I would hold the 1/13 surgery date until we find out if they can see her.  I am not sure who does carpal tunnel there, so will need to call when I am back in the office on Monday.  I went ahead and put in the referral to help speed things along.

## 2024-12-12 NOTE — TELEPHONE ENCOUNTER
I briefly explained the patients situation to Yodit Diggs and unfortunately, their providers do not have anything available for a carpal tunnel release until after 1/13/25. I discussed this with Shaye to see if she knew of another surgeon I could possibly refer to. Shaye recommended Dr. Lucio Vo, plastic surgeon. I spoke with Krys at Dr. Vo's office to briefly explain to her what was going on. She requested that I fax the referral to their office. She will give it to Dr. Vo to review to see if he will be able to accommodate the patient before the end of the year. I spoke with the patient to advise of this. She did want to stay in the [facility] network but I told her Dr. Vo is affiliated with [facility], he is just not internal with us. She is agreeable to move forward with this referral as she is really trying to get this done. I advised her I will fax the referral now and will be back in touch with her Monday to touch base on this. She expressed her gratitude and did apologize if she came off ugly in any way from the previous phone call she had with Madhu. She is just frustrated because her pain is getting worse. Of note, the date of 1/13/25 with Dr. Alas will not work out for her either. If she cannot be scheduled with Dr. Valdes, her sx with Dr. Alas will have to be after 1/24/25.

## 2024-12-12 NOTE — TELEPHONE ENCOUNTER
I am currently secure-chatting Yodit Diggs in regards to the referral that was placed to see if there is any way this can be expedited.

## 2024-12-13 NOTE — TELEPHONE ENCOUNTER
I agree with this outlined plan of care.     Orders Placed This Encounter   Procedures    Ambulatory referral/consult to Orthopedics

## 2024-12-20 ENCOUNTER — HOSPITAL ENCOUNTER (OUTPATIENT)
Dept: RADIOLOGY | Facility: HOSPITAL | Age: 59
Discharge: HOME | End: 2024-12-20
Attending: OBSTETRICS & GYNECOLOGY
Payer: COMMERCIAL

## 2024-12-20 DIAGNOSIS — Z12.31 OTHER SCREENING MAMMOGRAM: ICD-10-CM

## 2024-12-20 PROCEDURE — 77067 SCR MAMMO BI INCL CAD: CPT | Mod: 26,,, | Performed by: RADIOLOGY

## 2024-12-20 PROCEDURE — 77063 BREAST TOMOSYNTHESIS BI: CPT | Mod: 26,,, | Performed by: RADIOLOGY

## 2024-12-20 PROCEDURE — 77067 SCR MAMMO BI INCL CAD: CPT | Mod: TC

## 2024-12-26 ENCOUNTER — OFFICE VISIT (OUTPATIENT)
Dept: FAMILY MEDICINE | Facility: CLINIC | Age: 59
End: 2024-12-26
Payer: COMMERCIAL

## 2024-12-26 ENCOUNTER — PATIENT MESSAGE (OUTPATIENT)
Dept: NEUROSURGERY | Facility: CLINIC | Age: 59
End: 2024-12-26
Payer: COMMERCIAL

## 2024-12-26 VITALS
BODY MASS INDEX: 39.38 KG/M2 | OXYGEN SATURATION: 100 % | HEART RATE: 65 BPM | RESPIRATION RATE: 18 BRPM | DIASTOLIC BLOOD PRESSURE: 83 MMHG | SYSTOLIC BLOOD PRESSURE: 129 MMHG | HEIGHT: 62 IN | WEIGHT: 214 LBS

## 2024-12-26 DIAGNOSIS — S06.0X0A CONCUSSION WITHOUT LOSS OF CONSCIOUSNESS, INITIAL ENCOUNTER: Primary | ICD-10-CM

## 2024-12-26 PROCEDURE — 99214 OFFICE O/P EST MOD 30 MIN: CPT | Mod: ,,, | Performed by: FAMILY MEDICINE

## 2024-12-26 PROCEDURE — 1159F MED LIST DOCD IN RCRD: CPT | Mod: CPTII,,, | Performed by: FAMILY MEDICINE

## 2024-12-26 PROCEDURE — 3008F BODY MASS INDEX DOCD: CPT | Mod: CPTII,,, | Performed by: FAMILY MEDICINE

## 2024-12-26 PROCEDURE — 3044F HG A1C LEVEL LT 7.0%: CPT | Mod: CPTII,,, | Performed by: FAMILY MEDICINE

## 2024-12-26 PROCEDURE — G2211 COMPLEX E/M VISIT ADD ON: HCPCS | Mod: ,,, | Performed by: FAMILY MEDICINE

## 2024-12-26 PROCEDURE — 3079F DIAST BP 80-89 MM HG: CPT | Mod: CPTII,,, | Performed by: FAMILY MEDICINE

## 2024-12-26 PROCEDURE — 1160F RVW MEDS BY RX/DR IN RCRD: CPT | Mod: CPTII,,, | Performed by: FAMILY MEDICINE

## 2024-12-26 PROCEDURE — 3074F SYST BP LT 130 MM HG: CPT | Mod: CPTII,,, | Performed by: FAMILY MEDICINE

## 2024-12-26 RX ORDER — MECLIZINE HYDROCHLORIDE 25 MG/1
25 TABLET ORAL 3 TIMES DAILY PRN
Qty: 30 TABLET | Refills: 0 | Status: SHIPPED | OUTPATIENT
Start: 2024-12-26 | End: 2025-01-25

## 2024-12-27 NOTE — PROGRESS NOTES
Patient ID: [id number]     Chief Complaint:  Dizziness/headache    HPI:     Rae Gudino is a 59 y.o. female here today for acute visit :   1) Approximately 1 week ago-patient was closing her car trunk-her head got in the way-trunk hit the top of her head-since then she has been having intermittent episodes of headaches and dizziness-uncomfortable but not excruciating-patient became concerned yesterday because of continuation of the symptoms with no improvement.  No LOC after head was hit-no recent changes in vision-no vomiting-appetite has been okay.  Patient had similar episode few years ago.  CT of the head was normal.      Past Medical History:   Diagnosis Date    Carpal tunnel syndrome, bilateral     Essential hypertension 2022    Gastric reflux 2023    Left arm numbness     Migraine with aura and without status migrainosus, not intractable 2023    Mixed hyperlipidemia 2022    Prediabetes 2022    Seasonal allergies 2022        Past Surgical History:   Procedure Laterality Date    APPENDECTOMY  over 30 years    was in for another surgery and dr decided to remove it     SECTION      COLONOSCOPY      gallstone removal      HYSTERECTOMY      partial    MOUTH SURGERY      PARTIAL HYSTERECTOMY      REPAIR OF MENISCUS OF KNEE Left     TUBAL LIGATION          Social History     Tobacco Use    Smoking status: Never    Smokeless tobacco: Never   Substance Use Topics    Alcohol use: Not Currently     Alcohol/week: 1.0 standard drink of alcohol     Types: 1 Glasses of wine per week     Comment: likely once/twice every few months    Drug use: Never        Social History     Socioeconomic History    Marital status:      Spouse name: Crispin    Number of children: 4        Current Outpatient Medications   Medication Instructions    albuterol (PROVENTIL/VENTOLIN HFA) 90 mcg/actuation inhaler 2 puff    amLODIPine (NORVASC) 5 mg, Oral, Daily    atorvastatin (LIPITOR) 20  "mg, Oral    famotidine (PEPCID) 40 mg, Oral, Daily    fluticasone propionate (FLONASE) 50 mcg/actuation nasal spray 1 spray in each nostril    meclizine (ANTIVERT) 25 mg, Oral, 3 times daily PRN    metFORMIN (GLUCOPHAGE) 500 mg, Oral    montelukast (SINGULAIR) 10 mg tablet 1 tablet    olopatadine-mometasone (RYALTRIS) 665-25 mcg/spray Spry 2 sprays, Nasal, 2 times daily    omeprazole (PRILOSEC) 40 mg, Oral, Every morning    rizatriptan (MAXALT-MLT) 10 MG disintegrating tablet DISSOLVE 1 TABLET ON THE TONGUE EVERY DAY AS NEEDED       Review of patient's allergies indicates:   Allergen Reactions    Penicillins Rash and Other (See Comments)        Patient Care Team:  Sera Allen MD as PCP - General (Family Medicine)  Sb Sanford MD as Consulting Physician (Orthopedic Surgery)  Lyn Alas MD (Neurosurgery)  Darius Winter Jr., MD as Consulting Physician (Otolaryngology)  Don Arana MD (Inactive) as Resident (Hand Surgery)       Subjective:     Review of Systems    12 point review of systems conducted, negative except as stated in the history of present illness. See HPI for details.      Objective:     Visit Vitals  /83 (BP Location: Right arm, Patient Position: Sitting)   Pulse 65   Resp 18   Ht 5' 2" (1.575 m)   Wt 97.1 kg (214 lb)   SpO2 100%   BMI 39.14 kg/m²       Physical Exam    General: Alert and oriented, No acute distress.  Head: Normocephalic, Atraumatic.  Eye: Pupils are equal, round and reactive to light, Extraocular movements are intact, Sclera non-icteric.  Ears/Nose/Throat: Normal, Mucosa moist,Clear.  Neck/Thyroid: Supple, Non-tender  Respiratory: Clear to auscultation bilaterally  Cardiovascular: Regular rate and rhythm, S1/S2 normal  Gastrointestinal: Soft, Non-tender, Non-distended, Normal bowel sounds, No palpable organomegaly.  Musculoskeletal: Normal range of motion.  Integumentary: Warm, Dry  Extremities: No clubbing, cyanosis or edema  Neurologic: No focal deficits, " Cranial Nerves II-XII are grossly intact  Psychiatric: Normal interaction, Coherent speech       Assessment:       ICD-10-CM ICD-9-CM   1. Concussion without loss of consciousness, initial encounter  S06.0X0A 850.0        Plan:     1. Concussion without loss of consciousness, initial encounter (Primary)  Pathophysiology/Treatment/ Dangers Discussed.  Symptomatic treatment including hydration/pain management/management options for dizziness discussed-lifestyle modifications with concussion discussed-CT of the head to rule out any acute trauma.   - CT Head Without Contrast; Future  - meclizine (ANTIVERT) 25 mg tablet; Take 1 tablet (25 mg total) by mouth 3 (three) times daily as needed for Dizziness.  Dispense: 30 tablet; Refill: 0         Follow up if symptoms worsen or fail to improve. In addition to their scheduled follow up, the patient has also been instructed to follow up on as needed basis.     Future Appointments   Date Time Provider Department Center   12/30/2024  3:00 PM [facility] CT1 [facility]   5/30/2025 10:45 AM Sera Allen MD [facility] ANIYA Allen MD

## 2025-01-29 ENCOUNTER — TELEPHONE (OUTPATIENT)
Dept: FAMILY MEDICINE | Facility: CLINIC | Age: 60
End: 2025-01-29
Payer: COMMERCIAL

## 2025-01-31 ENCOUNTER — OFFICE VISIT (OUTPATIENT)
Dept: FAMILY MEDICINE | Facility: CLINIC | Age: 60
End: 2025-01-31
Payer: COMMERCIAL

## 2025-01-31 VITALS
OXYGEN SATURATION: 97 % | BODY MASS INDEX: 40.05 KG/M2 | HEART RATE: 71 BPM | DIASTOLIC BLOOD PRESSURE: 81 MMHG | HEIGHT: 62 IN | WEIGHT: 217.63 LBS | SYSTOLIC BLOOD PRESSURE: 123 MMHG | RESPIRATION RATE: 18 BRPM

## 2025-01-31 DIAGNOSIS — F07.81 POST CONCUSSION SYNDROME: Primary | ICD-10-CM

## 2025-01-31 PROCEDURE — 3074F SYST BP LT 130 MM HG: CPT | Mod: CPTII,,, | Performed by: FAMILY MEDICINE

## 2025-01-31 PROCEDURE — 99214 OFFICE O/P EST MOD 30 MIN: CPT | Mod: ,,, | Performed by: FAMILY MEDICINE

## 2025-01-31 PROCEDURE — 3008F BODY MASS INDEX DOCD: CPT | Mod: CPTII,,, | Performed by: FAMILY MEDICINE

## 2025-01-31 PROCEDURE — G2211 COMPLEX E/M VISIT ADD ON: HCPCS | Mod: ,,, | Performed by: FAMILY MEDICINE

## 2025-01-31 PROCEDURE — 1159F MED LIST DOCD IN RCRD: CPT | Mod: CPTII,,, | Performed by: FAMILY MEDICINE

## 2025-01-31 PROCEDURE — 1160F RVW MEDS BY RX/DR IN RCRD: CPT | Mod: CPTII,,, | Performed by: FAMILY MEDICINE

## 2025-01-31 PROCEDURE — 3079F DIAST BP 80-89 MM HG: CPT | Mod: CPTII,,, | Performed by: FAMILY MEDICINE

## 2025-01-31 NOTE — PROGRESS NOTES
Patient ID: [id number]     Chief Complaint: Headache and Nausea      HPI:     Rae Gudino is a 59 y.o. female here today for acute visit :   Seemed to be improving after concussion-however this week started to have pain in the exact spot she hit with the trunk of her car-in addition to the headaches she is experiencing nausea-symptoms were much more intense for the last few days-now only mild.  Patient does have migraine headaches-this headache does not feel like migraine headache-patient did take migraine medication no improvement with migraine headache significant relief.  Headache is constant-uncomfortable-but not excruciating-unable to correlate any alleviating or aggravating factors-no acute changes in diet or activity-no associated URI symptoms.       Past Medical History:   Diagnosis Date    Carpal tunnel syndrome, bilateral     Essential hypertension 2022    Gastric reflux 2023    Left arm numbness     Migraine with aura and without status migrainosus, not intractable 2023    Mixed hyperlipidemia 2022    Prediabetes 2022    Seasonal allergies 2022        Past Surgical History:   Procedure Laterality Date    APPENDECTOMY  over 30 years    was in for another surgery and dr decided to remove it     SECTION      COLONOSCOPY      gallstone removal      HYSTERECTOMY      partial    MOUTH SURGERY      PARTIAL HYSTERECTOMY      REPAIR OF MENISCUS OF KNEE Left     TUBAL LIGATION          Social History     Tobacco Use    Smoking status: Never    Smokeless tobacco: Never   Substance Use Topics    Alcohol use: Not Currently     Alcohol/week: 1.0 standard drink of alcohol     Types: 1 Glasses of wine per week     Comment: likely once/twice every few months    Drug use: Never        Social History     Socioeconomic History    Marital status:      Spouse name: Crispin    Number of children: 4        Current Outpatient Medications   Medication Instructions     "albuterol (PROVENTIL/VENTOLIN HFA) 90 mcg/actuation inhaler 2 puff    amLODIPine (NORVASC) 5 mg, Oral, Daily    atorvastatin (LIPITOR) 20 mg, Oral    famotidine (PEPCID) 40 mg, Oral, Daily    fluticasone propionate (FLONASE) 50 mcg/actuation nasal spray 1 spray in each nostril    meclizine (ANTIVERT) 25 mg, Oral, 3 times daily PRN    metFORMIN (GLUCOPHAGE) 500 mg, Oral    montelukast (SINGULAIR) 10 mg tablet 1 tablet    olopatadine-mometasone (RYALTRIS) 665-25 mcg/spray Spry 2 sprays, Nasal, 2 times daily    omeprazole (PRILOSEC) 40 mg, Oral, Every morning    rizatriptan (MAXALT-MLT) 10 MG disintegrating tablet DISSOLVE 1 TABLET ON THE TONGUE EVERY DAY AS NEEDED       Review of patient's allergies indicates:   Allergen Reactions    Penicillins Rash and Other (See Comments)        Patient Care Team:  Sera Allen MD as PCP - General (Family Medicine)  Sb Sanford MD as Consulting Physician (Orthopedic Surgery)  Lyn Alas MD (Neurosurgery)  Darius Winter Jr., MD as Consulting Physician (Otolaryngology)  Don Arana MD (Inactive) as Resident (Hand Surgery)       Subjective:     Review of Systems    12 point review of systems conducted, negative except as stated in the history of present illness. See HPI for details.      Objective:     Visit Vitals  /81 (BP Location: Left arm, Patient Position: Sitting)   Pulse 71   Resp 18   Ht 5' 2" (1.575 m)   Wt 98.7 kg (217 lb 9.6 oz)   SpO2 97%   BMI 39.80 kg/m²       Physical Exam    General: Alert and oriented, No acute distress.  Head: Normocephalic, Atraumatic.  Eye: Pupils are equal, round and reactive to light, Extraocular movements are intact, Sclera non-icteric.  Ears/Nose/Throat: Normal, Mucosa moist,Clear.  Neck/Thyroid: Supple, Non-tender  Respiratory: Clear to auscultation bilaterally  Cardiovascular: Regular rate and rhythm, S1/S2 normal  Gastrointestinal: Soft, Non-tender, Non-distended, Normal bowel sounds, No palpable " organomegaly.  Musculoskeletal: Normal range of motion.  Integumentary: Warm, Dry  Extremities: No clubbing, cyanosis or edema  Neurologic: No focal deficits, Cranial Nerves II-XII are grossly intact  Psychiatric: Normal interaction, Coherent speech       Assessment:       ICD-10-CM ICD-9-CM   1. Post concussion syndrome  F07.81 310.2        Plan:     1. Post concussion syndrome (Primary)  Pathophysiology/Treatment/ Dangers Discussed.  CT scan was normal-treatment options further studies discussed at length with the patient-obtain MRI of the brain management based on finding.   - MRI Brain Without Contrast; Future      No follow-ups on file. In addition to their scheduled follow up, the patient has also been instructed to follow up on as needed basis.     Future Appointments   Date Time Provider Department Center   5/30/2025 10:45 AM Sera Allen MD [facility] [place] ANIYA Allen MD

## 2025-02-10 ENCOUNTER — HOSPITAL ENCOUNTER (OUTPATIENT)
Dept: RADIOLOGY | Facility: HOSPITAL | Age: 60
Discharge: HOME | End: 2025-02-10
Attending: FAMILY MEDICINE
Payer: COMMERCIAL

## 2025-02-10 DIAGNOSIS — F07.81 POST CONCUSSION SYNDROME: ICD-10-CM

## 2025-02-10 PROCEDURE — 70551 MRI BRAIN STEM W/O DYE: CPT | Mod: TC

## 2025-04-15 ENCOUNTER — LAB VISIT (OUTPATIENT)
Dept: LAB | Facility: HOSPITAL | Age: 60
End: 2025-04-15
Attending: FAMILY MEDICINE
Payer: COMMERCIAL

## 2025-04-15 DIAGNOSIS — Z00.00 WELLNESS EXAMINATION: ICD-10-CM

## 2025-04-15 LAB
ALBUMIN SERPL-MCNC: 3.6 G/DL (ref 3.5–5)
ALBUMIN/GLOB SERPL: 1 RATIO (ref 1.1–2)
ALP SERPL-CCNC: 78 UNIT/L (ref 40–150)
ALT SERPL-CCNC: 11 UNIT/L (ref 0–55)
ANION GAP SERPL CALC-SCNC: 4 MEQ/L
AST SERPL-CCNC: 14 UNIT/L (ref 11–45)
BACTERIA #/AREA URNS AUTO: ABNORMAL /HPF
BASOPHILS # BLD AUTO: 0.04 X10(3)/MCL
BASOPHILS NFR BLD AUTO: 0.8 %
BILIRUB SERPL-MCNC: 0.4 MG/DL
BILIRUB UR QL STRIP.AUTO: NEGATIVE
BUN SERPL-MCNC: 9.3 MG/DL (ref 9.8–20.1)
CALCIUM SERPL-MCNC: 9.2 MG/DL (ref 8.4–10.2)
CHLORIDE SERPL-SCNC: 109 MMOL/L (ref 98–107)
CHOLEST SERPL-MCNC: 218 MG/DL
CHOLEST/HDLC SERPL: 6 {RATIO} (ref 0–5)
CLARITY UR: CLEAR
CO2 SERPL-SCNC: 27 MMOL/L (ref 22–29)
COLOR UR AUTO: ABNORMAL
CREAT SERPL-MCNC: 0.72 MG/DL (ref 0.55–1.02)
CREAT/UREA NIT SERPL: 13
EOSINOPHIL # BLD AUTO: 0.3 X10(3)/MCL (ref 0–0.9)
EOSINOPHIL NFR BLD AUTO: 6 %
ERYTHROCYTE [DISTWIDTH] IN BLOOD BY AUTOMATED COUNT: 12.9 % (ref 11.5–17)
EST. AVERAGE GLUCOSE BLD GHB EST-MCNC: 111.2 MG/DL
GFR SERPLBLD CREATININE-BSD FMLA CKD-EPI: >60 ML/MIN/1.73/M2
GLOBULIN SER-MCNC: 3.7 GM/DL (ref 2.4–3.5)
GLUCOSE SERPL-MCNC: 85 MG/DL (ref 74–100)
GLUCOSE UR QL STRIP: NEGATIVE
HBA1C MFR BLD: 5.5 %
HCT VFR BLD AUTO: 40.7 % (ref 37–47)
HDLC SERPL-MCNC: 37 MG/DL (ref 35–60)
HGB BLD-MCNC: 13.2 G/DL (ref 12–16)
HGB UR QL STRIP: ABNORMAL
IMM GRANULOCYTES # BLD AUTO: 0 X10(3)/MCL (ref 0–0.04)
IMM GRANULOCYTES NFR BLD AUTO: 0 %
KETONES UR QL STRIP: NEGATIVE
LDLC SERPL CALC-MCNC: 133 MG/DL (ref 50–140)
LEUKOCYTE ESTERASE UR QL STRIP: NEGATIVE
LYMPHOCYTES # BLD AUTO: 2.23 X10(3)/MCL (ref 0.6–4.6)
LYMPHOCYTES NFR BLD AUTO: 45 %
MCH RBC QN AUTO: 27.2 PG (ref 27–31)
MCHC RBC AUTO-ENTMCNC: 32.4 G/DL (ref 33–36)
MCV RBC AUTO: 83.7 FL (ref 80–94)
MONOCYTES # BLD AUTO: 0.35 X10(3)/MCL (ref 0.1–1.3)
MONOCYTES NFR BLD AUTO: 7.1 %
MUCOUS THREADS URNS QL MICRO: ABNORMAL /LPF
NEUTROPHILS # BLD AUTO: 2.04 X10(3)/MCL (ref 2.1–9.2)
NEUTROPHILS NFR BLD AUTO: 41.1 %
NITRITE UR QL STRIP: NEGATIVE
NRBC BLD AUTO-RTO: 0 %
PH UR STRIP: 6 [PH]
PLATELET # BLD AUTO: 258 X10(3)/MCL (ref 130–400)
PMV BLD AUTO: 10.4 FL (ref 7.4–10.4)
POTASSIUM SERPL-SCNC: 4.3 MMOL/L (ref 3.5–5.1)
PROT SERPL-MCNC: 7.3 GM/DL (ref 6.4–8.3)
PROT UR QL STRIP: NEGATIVE
RBC # BLD AUTO: 4.86 X10(6)/MCL (ref 4.2–5.4)
RBC #/AREA URNS AUTO: ABNORMAL /HPF
SODIUM SERPL-SCNC: 140 MMOL/L (ref 136–145)
SP GR UR STRIP.AUTO: 1.02 (ref 1–1.03)
SQUAMOUS #/AREA URNS AUTO: ABNORMAL /HPF
TRIGL SERPL-MCNC: 242 MG/DL (ref 37–140)
TSH SERPL-ACNC: 0.38 UIU/ML (ref 0.35–4.94)
UROBILINOGEN UR STRIP-ACNC: 0.2
VLDLC SERPL CALC-MCNC: 48 MG/DL
WBC # BLD AUTO: 4.96 X10(3)/MCL (ref 4.5–11.5)
WBC #/AREA URNS AUTO: ABNORMAL /HPF

## 2025-04-15 PROCEDURE — 80061 LIPID PANEL: CPT

## 2025-04-15 PROCEDURE — 81003 URINALYSIS AUTO W/O SCOPE: CPT

## 2025-04-15 PROCEDURE — 36415 COLL VENOUS BLD VENIPUNCTURE: CPT

## 2025-04-15 PROCEDURE — 84443 ASSAY THYROID STIM HORMONE: CPT

## 2025-04-15 PROCEDURE — 85025 COMPLETE CBC W/AUTO DIFF WBC: CPT

## 2025-04-15 PROCEDURE — 83036 HEMOGLOBIN GLYCOSYLATED A1C: CPT

## 2025-04-15 PROCEDURE — 80053 COMPREHEN METABOLIC PANEL: CPT

## 2025-04-16 ENCOUNTER — RESULTS FOLLOW-UP (OUTPATIENT)
Dept: FAMILY MEDICINE | Facility: CLINIC | Age: 60
End: 2025-04-16

## 2025-04-16 ENCOUNTER — TELEPHONE (OUTPATIENT)
Dept: FAMILY MEDICINE | Facility: CLINIC | Age: 60
End: 2025-04-16
Payer: COMMERCIAL

## 2025-04-16 NOTE — TELEPHONE ENCOUNTER
----- Message from Sera Allen MD sent at 4/16/2025  8:26 AM CDT -----  Please make sure patient has appointment to discuss test results  ----- Message -----  From: Lab, Background User  Sent: 4/15/2025  10:31 AM CDT  To: Sera Allen MD

## 2025-05-16 ENCOUNTER — TELEPHONE (OUTPATIENT)
Dept: FAMILY MEDICINE | Facility: CLINIC | Age: 60
End: 2025-05-16
Payer: COMMERCIAL

## 2025-05-16 NOTE — TELEPHONE ENCOUNTER
Left voicemail to confirm appt - labs already done     1. Are there any outstanding tasks in the patient's chart? Yes, fasting labs    2. Is there any documentation in the chart? No    3.Has patient been seen in an ER, Urgent care clinic, or been admitted since last visit?  If yes, When, where, and why    4. Has patient seen any other healthcare providers since last visit?  If yes, when, where, and why    5. Has patient had any bloodwork or XR done since last visit?    6. Is patient signed up for patient portal?    No

## 2025-05-21 NOTE — PROGRESS NOTES
Patient ID: [id number]     Chief Complaint: Annual Exam        HPI:     Rae Gudino is a 60 y.o. female here today for an annual wellness. Reviewed and discussed lab results.   Patient is very excited she is going to restart working part-time at a .  Continuing to make modifications to diet and lifestyle in order to increase quality of life.   1) Hypertension: Patient has been taking Norvasc 5 mg. BP is normal at home. No symptoms associated with increased BP such as headache/ visual changes/ dizziness/ chest pain.   2) Hyperlipidemia: Patient is taking Lipitor 20 mg. No side effects of medication noted.  3)  Reflux: Patient is continuing to take omeprazole 40 mg-no symptoms associated with reflux.  No noticeable side effects of medication.  4)  Seasonal Allergies: Patient is continuing to take medication as needed-symptoms are controlled.  No noticeable side effects of medication.      Past Medical History:   Diagnosis Date    Carpal tunnel syndrome, bilateral     Essential hypertension 2022    Gastric reflux 2023    Left arm numbness     Migraine with aura and without status migrainosus, not intractable 2023    Mixed hyperlipidemia 2022    Prediabetes 2022    Seasonal allergies 2022        Past Surgical History:   Procedure Laterality Date    APPENDECTOMY  over 30 years    was in for another surgery and dr decided to remove it     SECTION      COLONOSCOPY      gallstone removal      HYSTERECTOMY      partial    MOUTH SURGERY      PARTIAL HYSTERECTOMY      REPAIR OF MENISCUS OF KNEE Left     TUBAL LIGATION          Social History[1]     Social History[2]     Current Outpatient Medications   Medication Instructions    albuterol (PROVENTIL/VENTOLIN HFA) 90 mcg/actuation inhaler 2 puff    amLODIPine (NORVASC) 5 mg, Oral, Daily    famotidine (PEPCID) 40 mg, Oral, Daily    fluticasone propionate (FLONASE) 50 mcg/actuation nasal spray 1 spray in each nostril     "meclizine (ANTIVERT) 25 mg, Oral, 3 times daily PRN    metFORMIN (GLUCOPHAGE) 500 mg, Oral    montelukast (SINGULAIR) 10 mg tablet 1 tablet    olopatadine-mometasone (RYALTRIS) 665-25 mcg/spray Spry 2 sprays, Nasal, 2 times daily    omeprazole (PRILOSEC) 40 mg, Oral, Every morning    rizatriptan (MAXALT-MLT) 10 MG disintegrating tablet DISSOLVE 1 TABLET ON THE TONGUE EVERY DAY AS NEEDED    rosuvastatin (CRESTOR) 20 mg, Oral, Daily       Review of patient's allergies indicates:   Allergen Reactions    Penicillins Rash and Other (See Comments)        Patient Care Team:  Sera Allen MD as PCP - General (Family Medicine)  Sb Sanford MD as Consulting Physician (Orthopedic Surgery)  Lyn Alas MD (Neurosurgery)  Darius Winter Jr., MD as Consulting Physician (Otolaryngology)  Don Arana MD (Inactive) as Resident (Hand Surgery)     Subjective:     Review of Systems    12 point review of systems conducted, negative except as stated in the history of present illness. See HPI for details.      Objective:     Visit Vitals  /76 (BP Location: Left arm, Patient Position: Sitting)   Pulse (!) 59   Resp 16   Ht 5' 2" (1.575 m)   Wt 98.3 kg (216 lb 12.8 oz)   SpO2 98%   BMI 39.65 kg/m²       Physical Exam    General: Alert and oriented, No acute distress.  Head: Normocephalic, Atraumatic.  Eye: Pupils are equal, round and reactive to light, Extraocular movements are intact, Sclera non-icteric.  Ears/Nose/Throat: Normal, Mucosa moist,Clear.  Neck/Thyroid: Supple, Non-tender, No palpable thyromegaly or thyroid nodule, No lymphadenopathy, No JVD, Full range of motion.  Respiratory: Clear to auscultation bilaterally; No wheezes, rales or rhonchi,Non-labored respirations, Symmetrical chest wall expansion.  Cardiovascular: Regular rate and rhythm, S1/S2 normal  Gastrointestinal: Soft, Non-tender, Non-distended, Normal bowel sounds, No palpable organomegaly.  Musculoskeletal: Normal range of " "motion.  Integumentary: Warm, Dry, Intact, No suspicious lesions or rashes.  Extremities: No clubbing, cyanosis or edema  Neurologic: No focal deficits, Cranial Nerves II-XII are grossly intact, Motor strength normal upper and lower extremities, Sensory exam intact.  Psychiatric: Normal interaction, Coherent speech, Euthymic mood, Appropriate affect       Assessment:       ICD-10-CM ICD-9-CM   1. Wellness examination  Z00.00 V70.0   2. Essential hypertension  I10 401.9   3. Mixed hyperlipidemia  E78.2 272.2   4. Prediabetes  R73.03 790.29   5. Seasonal allergies  J30.2 477.9   6. Migraine with aura and without status migrainosus, not intractable  G43.109 346.00   7. Gastric reflux  K21.9 530.81   8. GAETANO (obstructive sleep apnea)  G47.33 327.23   9. Left thyroid nodule  E04.1 241.0        Plan:       Cervical Cancer Screening - Patient has had a hysterectomy    Breast Cancer Screening - Mammogram up to date    Colon Cancer Screening - Colon cancer screening completed    Eye Exam - Recommend annually.    Dental Exam - Recommend biannual exams.     Vaccinations -   Immunization History   Administered Date(s) Administered    COVID-19 Vaccine 08/10/2021, 08/31/2021    COVID-19, MRNA, LN-S, PF (Pfizer) (Purple Cap) 08/10/2021, 08/31/2021    Influenza - Quadrivalent - PF *Preferred* (6 months and older) 10/05/2021, 10/11/2023    Influenza - Trivalent - Fluarix, Flulaval, Fluzone, Afluria - PF 10/05/2021    Immunization guidelines reviewed with the patient.  Encourage patient to prevent disease through immunizations.    1. Wellness examination (Primary)  Wellness: Five Rules Reviewed:  Healthy community-Relationships/ Water/Nutrition-Real Food, Limit Fast Food/ Exercise 20-30 minutes most days of the week/ Mental health- "Is your work a calling or paycheck" Define 'What is your purpose-what matters to you' Stress- Is an Individual Response- Therefore Can be Controlled by the Individual. Meditation/ " Immunizations/Multivitamin use-Vitamin D3 2000/ Screenings     2. Essential hypertension  Patient has been taking Norvasc 5 mg. Blood pressure goal of less than 130/80-blood pressure is stable. Continue to encourage diet and activity modifications. Including stress management. Pathophysiology/treatment/dangers discussed.      3. Mixed hyperlipidemia  Lab Results   Component Value Date    CHOL 218 (H) 04/15/2025    .00 04/15/2025    TRIG 242 (H) 04/15/2025    HDL 37 04/15/2025     Pathophysiology/treatment/dangers discussed. Patient to continue diet modification-Lipitor 20-LDL not at goal-patient is started on Crestor 20 mg-Co Q10 200.   Total cholesterol 218 ( Goal less than 200) HDL 37 ( Goal high as possible)  ( Goal less than 100) Triglycerides 242 ( Goal less than 150)    - rosuvastatin (CRESTOR) 20 MG tablet; Take 1 tablet (20 mg total) by mouth once daily.  Dispense: 90 tablet; Refill: 3  - Comprehensive Metabolic Panel; Future  - Lipid Panel; Future    4. Prediabetes  Hemoglobin A1c  .  Lab Results   Component Value Date    HGBA1C 5.5 04/15/2025     Normal less than 5.7 - Diagnosis of Type 2 Diabetes Mellitus at 6.5. Encourage diet and activity modification- patient to continue metformin 500 mg once a day- Pathophysiology/treatment/dangers discussed.    5. Seasonal allergies  Patient is currently stable.  No acute modifications recommended.  Continue with current treatment-Singulair-nasal spray-p.r.n. use of inhaler.    6. Migraine with aura and without status migrainosus, not intractable  Patient is currently stable.  No acute modifications recommended.  Continue with current treatment-p.r.n. use of Maxalt.    7. Gastric reflux  Patient is currently stable.  No acute modifications recommended.  Continue with current treatment-omeprazole 40 mg.    8. Left thyroid nodule  Patient is comanage with ENT - obtain ultrasound management based on finding  - US Soft Tissue Head Neck; Future    9. GAETANO  (obstructive sleep apnea)  Patient is currently stable.  No acute modifications recommended.  Continue with current treatment-CPAP machine.              The patient's Health Maintenance was reviewed and the following appears to be due at this time:   Health Maintenance Due   Topic Date Due    TETANUS VACCINE  Never done    Colorectal Cancer Screening  Never done    Shingles Vaccine (1 of 2) Never done    Pneumococcal Vaccines (Age 50+) (1 of 1 - PCV) Never done         Follow up in about 3 months (around 8/30/2025) for Hyperlipidemia. In addition to their scheduled follow up, the patient has also been instructed to follow up on as needed basis.     Future Appointments   Date Time Provider Department Center   9/4/2025 12:45 PM Sera Allen MD [facility]        Sera Allen MD           [1]   Social History  Tobacco Use    Smoking status: Never    Smokeless tobacco: Never   Substance Use Topics    Alcohol use: Not Currently     Alcohol/week: 1.0 standard drink of alcohol     Types: 1 Glasses of wine per week     Comment: likely once/twice every few months    Drug use: Never   [2]   Social History  Socioeconomic History    Marital status:      Spouse name: Crispin    Number of children: 4

## 2025-05-30 ENCOUNTER — OFFICE VISIT (OUTPATIENT)
Dept: FAMILY MEDICINE | Facility: CLINIC | Age: 60
End: 2025-05-30
Payer: COMMERCIAL

## 2025-05-30 VITALS
HEART RATE: 59 BPM | BODY MASS INDEX: 39.9 KG/M2 | WEIGHT: 216.81 LBS | SYSTOLIC BLOOD PRESSURE: 136 MMHG | DIASTOLIC BLOOD PRESSURE: 76 MMHG | OXYGEN SATURATION: 98 % | HEIGHT: 62 IN | RESPIRATION RATE: 16 BRPM

## 2025-05-30 DIAGNOSIS — E04.1 LEFT THYROID NODULE: ICD-10-CM

## 2025-05-30 DIAGNOSIS — K21.9 GASTRIC REFLUX: ICD-10-CM

## 2025-05-30 DIAGNOSIS — E78.2 MIXED HYPERLIPIDEMIA: ICD-10-CM

## 2025-05-30 DIAGNOSIS — R73.03 PREDIABETES: ICD-10-CM

## 2025-05-30 DIAGNOSIS — I10 ESSENTIAL HYPERTENSION: ICD-10-CM

## 2025-05-30 DIAGNOSIS — G47.33 OSA (OBSTRUCTIVE SLEEP APNEA): ICD-10-CM

## 2025-05-30 DIAGNOSIS — Z00.00 WELLNESS EXAMINATION: Primary | ICD-10-CM

## 2025-05-30 DIAGNOSIS — J30.2 SEASONAL ALLERGIES: ICD-10-CM

## 2025-05-30 DIAGNOSIS — G43.109 MIGRAINE WITH AURA AND WITHOUT STATUS MIGRAINOSUS, NOT INTRACTABLE: ICD-10-CM

## 2025-05-30 RX ORDER — ROSUVASTATIN CALCIUM 20 MG/1
20 TABLET, COATED ORAL DAILY
Qty: 90 TABLET | Refills: 3 | Status: SHIPPED | OUTPATIENT
Start: 2025-05-30 | End: 2026-05-30

## 2025-06-03 ENCOUNTER — PATIENT OUTREACH (OUTPATIENT)
Facility: CLINIC | Age: 60
End: 2025-06-03
Payer: COMMERCIAL

## 2025-06-16 ENCOUNTER — HOSPITAL ENCOUNTER (OUTPATIENT)
Dept: RADIOLOGY | Facility: HOSPITAL | Age: 60
Discharge: HOME | End: 2025-06-16
Attending: FAMILY MEDICINE
Payer: COMMERCIAL

## 2025-06-16 DIAGNOSIS — E04.1 LEFT THYROID NODULE: ICD-10-CM

## 2025-06-16 PROCEDURE — 76536 US EXAM OF HEAD AND NECK: CPT | Mod: TC

## 2025-06-17 ENCOUNTER — RESULTS FOLLOW-UP (OUTPATIENT)
Dept: FAMILY MEDICINE | Facility: CLINIC | Age: 60
End: 2025-06-17

## 2025-06-19 ENCOUNTER — TELEPHONE (OUTPATIENT)
Dept: FAMILY MEDICINE | Facility: CLINIC | Age: 60
End: 2025-06-19
Payer: COMMERCIAL

## 2025-06-19 NOTE — TELEPHONE ENCOUNTER
Pt called and states you called her daughter to discuss the results for her. She states she did not have a missed call. She asked that you please call her at [phone].

## 2025-06-21 DIAGNOSIS — I10 ESSENTIAL HYPERTENSION: ICD-10-CM

## 2025-06-23 RX ORDER — AMLODIPINE BESYLATE 5 MG/1
5 TABLET ORAL
Qty: 90 TABLET | Refills: 3 | Status: SHIPPED | OUTPATIENT
Start: 2025-06-23

## 2025-07-14 ENCOUNTER — TELEPHONE (OUTPATIENT)
Dept: FAMILY MEDICINE | Facility: CLINIC | Age: 60
End: 2025-07-14
Payer: COMMERCIAL

## 2025-07-14 NOTE — TELEPHONE ENCOUNTER
Pt called today requesting you give her a call regarding her thyroid ultrasound. She was the pt who you couldn't get in touch with in June. She ask that you call her after 1 pm due to her work schedule.

## 2025-07-17 ENCOUNTER — TELEPHONE (OUTPATIENT)
Dept: FAMILY MEDICINE | Facility: CLINIC | Age: 60
End: 2025-07-17
Payer: COMMERCIAL

## 2025-07-17 NOTE — TELEPHONE ENCOUNTER
----- Message from Sera Allen MD sent at 7/17/2025 11:34 AM CDT -----  Please have patient make appointment to discuss test results.  ----- Message -----  From: Carolyn Nielsen LPN  Sent: 7/14/2025   3:48 PM CDT  To: Sera Allen MD

## 2025-07-20 NOTE — PROGRESS NOTES
Patient ID: [id number]     Chief Complaint:  Thyroid nodule    HPI:     This is a telemedicine note. Patient was treated using telemedicine, real time audio and video, according to [facility] protocols. Sera NAYAK MD, conducted the visit from the [facility] in [place], [place]. The patient participated in the visit at a non-[facility] location selected by the patient, identified below. I am licensed in the state where the patient stated they are located. The patient stated that they understood and accepted the privacy and security risks to their information at their location. This visit is not recorded.      Rae Gudino is a 60 y.o. female here today for a telemedicine visit.     Patient was located at the patient's home.  Appointment was made to discuss results of ultrasound.    Past Medical History:   Diagnosis Date    Carpal tunnel syndrome, bilateral     Essential hypertension 2022    Gastric reflux 2023    Left arm numbness     Migraine with aura and without status migrainosus, not intractable 2023    Mixed hyperlipidemia 2022    Prediabetes 2022    Seasonal allergies 2022        Past Surgical History:   Procedure Laterality Date    APPENDECTOMY  over 30 years    was in for another surgery and dr decided to remove it     SECTION      COLONOSCOPY      gallstone removal      HYSTERECTOMY      partial    MOUTH SURGERY      PARTIAL HYSTERECTOMY      REPAIR OF MENISCUS OF KNEE Left     TUBAL LIGATION          Social History[1]     Social History[2]     Current Outpatient Medications   Medication Instructions    albuterol (PROVENTIL/VENTOLIN HFA) 90 mcg/actuation inhaler 2 puff    amLODIPine (NORVASC) 5 mg, Oral    famotidine (PEPCID) 40 mg, Oral, Daily    fluticasone propionate (FLONASE) 50 mcg/actuation nasal spray 1 spray in each nostril    meclizine (ANTIVERT) 25 mg, Oral, 3 times daily PRN    metFORMIN (GLUCOPHAGE) 500 mg, Oral    montelukast  (SINGULAIR) 10 mg tablet 1 tablet    olopatadine-mometasone (RYALTRIS) 665-25 mcg/spray Spry 2 sprays, Nasal, 2 times daily    rizatriptan (MAXALT-MLT) 10 MG disintegrating tablet DISSOLVE 1 TABLET ON THE TONGUE EVERY DAY AS NEEDED    rosuvastatin (CRESTOR) 20 mg, Oral, Daily       Review of patient's allergies indicates:   Allergen Reactions    Penicillins Rash and Other (See Comments)        Patient Care Team:  Sera Allen MD as PCP - General (Family Medicine)  Sb Sanford MD as Consulting Physician (Orthopedic Surgery)  Lyn Alas MD (Neurosurgery)  Darius Winter Jr., MD as Consulting Physician (Otolaryngology)  Don Arana MD (Inactive) as Resident (Hand Surgery)     Subjective:     Review of Systems    12 point review of systems conducted, negative except as stated in the history of present illness. See HPI for details.    Objective:     There were no vitals taken for this visit.    Physical Exam    Physical Exam: LIMITED DUE TO TELEMEDICINE RESTRICTIONS.  General: Alert and oriented, No acute distress.  Head: Normocephalic, Atraumatic.  Eye: Sclera non-icteric.  Neck/Thyroid:  Full range of motion.  Respiratory: Non-labored respirations, Symmetrical chest wall expansion.  Musculoskeletal: Normal range of motion.  Integumentary:  No visible suspicious lesions or rashes. No diaphoresis.   Neurologic: No focal deficits  Psychiatric: Normal interaction, Coherent speech, Euthymic mood, Appropriate affect       Assessment:       ICD-10-CM ICD-9-CM   1. Thyroid nodule  E04.1 241.0        Plan:     1. Thyroid nodule (Primary)  Ultrasound results discuss-patient was referred to ENT-biopsy was delayed due to other health issues-recommendation for patient to follow-up with ENT as soon as possible.  Need for biopsy.       No follow-ups on file. In addition to their scheduled follow up, the patient has also been instructed to follow up on as needed basis.     Future Appointments   Date Time Provider  Department Center   9/4/2025 12:45 PM Sera Allen MD [facility]        Video Time Documentation:  Face to face discussed health concerns. More than 50% of this time was spent in counseling and coordination of care.    Sera Allen MD                    [1]   Social History  Tobacco Use    Smoking status: Never    Smokeless tobacco: Never   Substance Use Topics    Alcohol use: Not Currently     Alcohol/week: 1.0 standard drink of alcohol     Types: 1 Glasses of wine per week     Comment: likely once/twice every few months    Drug use: Never   [2]   Social History  Socioeconomic History    Marital status:      Spouse name: Crispin    Number of children: 4

## 2025-07-22 ENCOUNTER — OFFICE VISIT (OUTPATIENT)
Dept: FAMILY MEDICINE | Facility: CLINIC | Age: 60
End: 2025-07-22
Payer: COMMERCIAL

## 2025-07-22 DIAGNOSIS — E04.1 THYROID NODULE: Primary | ICD-10-CM

## 2025-07-22 PROCEDURE — 1159F MED LIST DOCD IN RCRD: CPT | Mod: CPTII,95,, | Performed by: FAMILY MEDICINE

## 2025-07-22 PROCEDURE — 1160F RVW MEDS BY RX/DR IN RCRD: CPT | Mod: CPTII,95,, | Performed by: FAMILY MEDICINE

## 2025-07-22 PROCEDURE — 3044F HG A1C LEVEL LT 7.0%: CPT | Mod: CPTII,95,, | Performed by: FAMILY MEDICINE

## 2025-07-22 PROCEDURE — 98005 SYNCH AUDIO-VIDEO EST LOW 20: CPT | Mod: 95,,, | Performed by: FAMILY MEDICINE

## 2025-08-16 ENCOUNTER — LAB VISIT (OUTPATIENT)
Dept: LAB | Facility: HOSPITAL | Age: 60
End: 2025-08-16
Attending: FAMILY MEDICINE
Payer: COMMERCIAL

## 2025-08-16 DIAGNOSIS — E78.2 MIXED HYPERLIPIDEMIA: ICD-10-CM

## 2025-08-16 LAB
ALBUMIN SERPL-MCNC: 3.2 G/DL (ref 3.5–5)
ALBUMIN/GLOB SERPL: 0.8 RATIO (ref 1.1–2)
ALP SERPL-CCNC: 78 UNIT/L (ref 40–150)
ALT SERPL-CCNC: 18 UNIT/L (ref 0–55)
ANION GAP SERPL CALC-SCNC: 5 MEQ/L
AST SERPL-CCNC: 18 UNIT/L (ref 11–45)
BILIRUB SERPL-MCNC: 0.4 MG/DL
BUN SERPL-MCNC: 9 MG/DL (ref 9.8–20.1)
CALCIUM SERPL-MCNC: 9 MG/DL (ref 8.4–10.2)
CHLORIDE SERPL-SCNC: 110 MMOL/L (ref 98–107)
CHOLEST SERPL-MCNC: 245 MG/DL
CHOLEST/HDLC SERPL: 6 {RATIO} (ref 0–5)
CO2 SERPL-SCNC: 24 MMOL/L (ref 22–29)
CREAT SERPL-MCNC: 0.75 MG/DL (ref 0.55–1.02)
CREAT/UREA NIT SERPL: 12
GFR SERPLBLD CREATININE-BSD FMLA CKD-EPI: >60 ML/MIN/1.73/M2
GLOBULIN SER-MCNC: 4.2 GM/DL (ref 2.4–3.5)
GLUCOSE SERPL-MCNC: 89 MG/DL (ref 74–100)
HDLC SERPL-MCNC: 38 MG/DL (ref 35–60)
LDLC SERPL CALC-MCNC: 168 MG/DL (ref 50–140)
POTASSIUM SERPL-SCNC: 4.2 MMOL/L (ref 3.5–5.1)
PROT SERPL-MCNC: 7.4 GM/DL (ref 6.4–8.3)
SODIUM SERPL-SCNC: 139 MMOL/L (ref 136–145)
TRIGL SERPL-MCNC: 197 MG/DL (ref 37–140)
VLDLC SERPL CALC-MCNC: 39 MG/DL

## 2025-08-16 PROCEDURE — 80061 LIPID PANEL: CPT

## 2025-08-16 PROCEDURE — 80053 COMPREHEN METABOLIC PANEL: CPT

## 2025-08-16 PROCEDURE — 36415 COLL VENOUS BLD VENIPUNCTURE: CPT

## 2025-08-26 ENCOUNTER — OFFICE VISIT (OUTPATIENT)
Dept: FAMILY MEDICINE | Facility: CLINIC | Age: 60
End: 2025-08-26
Payer: COMMERCIAL

## 2025-08-26 VITALS
HEIGHT: 62 IN | OXYGEN SATURATION: 97 % | SYSTOLIC BLOOD PRESSURE: 115 MMHG | WEIGHT: 216.19 LBS | HEART RATE: 73 BPM | BODY MASS INDEX: 39.78 KG/M2 | RESPIRATION RATE: 16 BRPM | DIASTOLIC BLOOD PRESSURE: 75 MMHG

## 2025-08-26 DIAGNOSIS — E04.1 LEFT THYROID NODULE: ICD-10-CM

## 2025-08-26 DIAGNOSIS — K21.9 GASTRIC REFLUX: ICD-10-CM

## 2025-08-26 DIAGNOSIS — I10 ESSENTIAL HYPERTENSION: Primary | ICD-10-CM

## 2025-08-26 DIAGNOSIS — E78.2 MIXED HYPERLIPIDEMIA: ICD-10-CM

## 2025-08-26 DIAGNOSIS — R73.03 PREDIABETES: ICD-10-CM

## 2025-08-26 PROCEDURE — 3074F SYST BP LT 130 MM HG: CPT | Mod: CPTII,,, | Performed by: FAMILY MEDICINE

## 2025-08-26 PROCEDURE — 1159F MED LIST DOCD IN RCRD: CPT | Mod: CPTII,,, | Performed by: FAMILY MEDICINE

## 2025-08-26 PROCEDURE — G2211 COMPLEX E/M VISIT ADD ON: HCPCS | Mod: ,,, | Performed by: FAMILY MEDICINE

## 2025-08-26 PROCEDURE — 99214 OFFICE O/P EST MOD 30 MIN: CPT | Mod: ,,, | Performed by: FAMILY MEDICINE

## 2025-08-26 PROCEDURE — 3078F DIAST BP <80 MM HG: CPT | Mod: CPTII,,, | Performed by: FAMILY MEDICINE

## 2025-08-26 PROCEDURE — 3044F HG A1C LEVEL LT 7.0%: CPT | Mod: CPTII,,, | Performed by: FAMILY MEDICINE

## 2025-08-26 PROCEDURE — 1160F RVW MEDS BY RX/DR IN RCRD: CPT | Mod: CPTII,,, | Performed by: FAMILY MEDICINE

## 2025-08-26 PROCEDURE — 3008F BODY MASS INDEX DOCD: CPT | Mod: CPTII,,, | Performed by: FAMILY MEDICINE

## 2025-08-26 RX ORDER — ROSUVASTATIN CALCIUM 40 MG/1
40 TABLET, COATED ORAL NIGHTLY
Qty: 90 TABLET | Refills: 3 | Status: SHIPPED | OUTPATIENT
Start: 2025-08-26 | End: 2026-08-26

## 2025-08-26 RX ORDER — METFORMIN HYDROCHLORIDE 500 MG/1
500 TABLET ORAL
Qty: 90 TABLET | Refills: 1 | Status: SHIPPED | OUTPATIENT
Start: 2025-08-26 | End: 2026-02-22